# Patient Record
Sex: FEMALE | Race: ASIAN | NOT HISPANIC OR LATINO | Employment: OTHER | URBAN - METROPOLITAN AREA
[De-identification: names, ages, dates, MRNs, and addresses within clinical notes are randomized per-mention and may not be internally consistent; named-entity substitution may affect disease eponyms.]

---

## 2017-02-11 DIAGNOSIS — K27.9 PEPTIC ULCER DISEASE: ICD-10-CM

## 2017-02-11 RX ORDER — PANTOPRAZOLE SODIUM 40 MG/1
TABLET, DELAYED RELEASE ORAL
Qty: 90 TABLET | Refills: 3 | Status: SHIPPED | OUTPATIENT
Start: 2017-02-11 | End: 2017-12-18 | Stop reason: SDUPTHER

## 2017-03-09 RX ORDER — BENAZEPRIL HYDROCHLORIDE 40 MG/1
40 TABLET ORAL EVERY MORNING
Qty: 90 TABLET | Refills: 1 | Status: SHIPPED | OUTPATIENT
Start: 2017-03-09 | End: 2017-05-25 | Stop reason: SDUPTHER

## 2017-04-18 ENCOUNTER — OFFICE VISIT (OUTPATIENT)
Dept: CARDIOLOGY | Facility: CLINIC | Age: 82
End: 2017-04-18
Payer: MEDICARE

## 2017-04-18 VITALS
WEIGHT: 129 LBS | BODY MASS INDEX: 24.35 KG/M2 | HEART RATE: 61 BPM | DIASTOLIC BLOOD PRESSURE: 81 MMHG | HEIGHT: 61 IN | SYSTOLIC BLOOD PRESSURE: 196 MMHG

## 2017-04-18 DIAGNOSIS — I25.10 CORONARY ARTERY DISEASE INVOLVING NATIVE CORONARY ARTERY OF NATIVE HEART WITHOUT ANGINA PECTORIS: Primary | ICD-10-CM

## 2017-04-18 DIAGNOSIS — I48.20 CHRONIC ATRIAL FIBRILLATION: ICD-10-CM

## 2017-04-18 DIAGNOSIS — I10 ESSENTIAL HYPERTENSION: ICD-10-CM

## 2017-04-18 DIAGNOSIS — I50.42 CHRONIC COMBINED SYSTOLIC AND DIASTOLIC CONGESTIVE HEART FAILURE: ICD-10-CM

## 2017-04-18 DIAGNOSIS — E11.9 TYPE 2 DIABETES MELLITUS WITHOUT COMPLICATION, WITHOUT LONG-TERM CURRENT USE OF INSULIN: ICD-10-CM

## 2017-04-18 DIAGNOSIS — E78.00 PURE HYPERCHOLESTEROLEMIA: ICD-10-CM

## 2017-04-18 PROCEDURE — 99999 PR PBB SHADOW E&M-EST. PATIENT-LVL III: CPT | Mod: PBBFAC,,, | Performed by: NURSE PRACTITIONER

## 2017-04-18 PROCEDURE — 1126F AMNT PAIN NOTED NONE PRSNT: CPT | Mod: S$GLB,,, | Performed by: NURSE PRACTITIONER

## 2017-04-18 PROCEDURE — 1160F RVW MEDS BY RX/DR IN RCRD: CPT | Mod: S$GLB,,, | Performed by: NURSE PRACTITIONER

## 2017-04-18 PROCEDURE — 99499 UNLISTED E&M SERVICE: CPT | Mod: S$GLB,,, | Performed by: NURSE PRACTITIONER

## 2017-04-18 PROCEDURE — 99213 OFFICE O/P EST LOW 20 MIN: CPT | Mod: S$GLB,,, | Performed by: NURSE PRACTITIONER

## 2017-04-18 PROCEDURE — 1159F MED LIST DOCD IN RCRD: CPT | Mod: S$GLB,,, | Performed by: NURSE PRACTITIONER

## 2017-04-18 RX ORDER — PRAVASTATIN SODIUM 40 MG/1
40 TABLET ORAL DAILY
COMMUNITY
End: 2017-05-19 | Stop reason: CLARIF

## 2017-04-18 NOTE — PROGRESS NOTES
Ms. Birch was last seen in [facility] 4/6/2016 by Dr. Elam.      Subjective:   Patient ID:  Sachin Birch is a 89 y.o. female who presents for follow-up of Hypertension (1 yr f/u )  .   HPI:    Ms. Birch is a 89yo female with a PMHx of CAD (NSTEMI in 2013; pos SPECT 2013), chronic atrial fibrillation, HTN, and HLD here for follow up. Patient and daughter report that she is doing well. No acute events since last visit one year ago. No bleeding issues. She weighs herself daily with no changes in weight above 1lb. BPs at home in low 150s systolic, 70s diastolic (always runs higher in clinic). Patient denies chest pain with exertion or at rest, palpitations, SOB, HERNANDEZ, dizziness, syncope, leg edema, claudication, PND, or orthopnea. She exercises by walking 15 minutes daily. She is currently taking ASA 81mg, pravastatin 40mg for CAD management. Carvedilol 25mg BID for CHF management. Amlodipine 5mg and benazepril 40mg for hypertension management.    Recent Cardiac Tests:    Cardiac event monitor (4/8/2016):  CONCLUSION:  Sinus rhythm with rare atrial ectopy, and the patient-reported symptoms correlating with normal sinus rhythm.     2D Echo (4/6/2016):  CONCLUSIONS     1 - Mildly enlarged ascending aorta.     2 - Moderate left atrial enlargement.     3 - Eccentric hypertrophy.     4 - Normal left ventricular systolic function (EF 55-60%) - improved since 11/13.     5 - Left ventricular diastolic dysfunction.     6 - Low normal right ventricular systolic function .     7 - Mild to moderate aortic regurgitation.     8 - Mild to moderate pulmonic regurgitation.     9 - The estimated PA systolic pressure is 28 mmHg.     History reviewed. No pertinent family history.      Current Outpatient Prescriptions   Medication Sig    amlodipine (NORVASC) 5 MG tablet TAKE 1 TABLET ONE TIME DAILY    apixaban (ELIQUIS) 2.5 mg Tab Take 1 tablet (2.5 mg total) by mouth 2 (two) times daily.    aspirin 81 MG Chew Take 1 tablet (81  "mg total) by mouth once daily.    benazepril (LOTENSIN) 40 MG tablet Take 1 tablet (40 mg total) by mouth every morning.    CALCIUM CARBONATE/VITAMIN D3 (CALCIUM 500 + D, D3, ORAL) Take by mouth.      carvedilol (COREG) 25 MG tablet Take 1 tablet (25 mg total) by mouth 2 (two) times daily with meals.    pantoprazole (PROTONIX) 40 MG tablet TAKE 1 TABLET ONE TIME DAILY    pravastatin (PRAVACHOL) 40 MG tablet Take 40 mg by mouth once daily.     No current facility-administered medications for this visit.        Review of Systems   Constitution: Negative for diaphoresis, weakness and malaise/fatigue.   HENT: Negative for headaches.    Eyes: Negative for blurred vision.   Cardiovascular: Negative for chest pain, claudication, dyspnea on exertion, irregular heartbeat, leg swelling, orthopnea, palpitations, paroxysmal nocturnal dyspnea and syncope.   Respiratory: Negative for cough, shortness of breath, snoring and wheezing.    Hematologic/Lymphatic: Negative for adenopathy and bleeding problem.   Skin: Negative for rash.   Musculoskeletal: Positive for arthritis. Negative for back pain, muscle weakness and myalgias.   Gastrointestinal: Negative for abdominal pain, constipation, diarrhea, nausea and vomiting.   Genitourinary: Negative for dysuria and hematuria.   Neurological: Negative for dizziness, loss of balance, numbness and paresthesias.   Psychiatric/Behavioral: Negative for altered mental status.   Allergic/Immunologic: Negative for persistent infections.     Objective:     Right Arm BP - Sittin/81 (17 1315)  Left Arm BP - Sittin/60 (17 1345)    BP (!) 196/81 (BP Location: Left arm, Patient Position: Sitting, BP Method: Automatic)  Pulse 61  Ht 5' 1" (1.549 m)  Wt 58.5 kg (128 lb 15.5 oz)  BMI 24.37 kg/m2    Physical Exam   Constitutional: She is oriented to person, place, and time. She appears well-developed and well-nourished.   HENT:   Head: Normocephalic.   Nose: Nose normal. "   Eyes: Pupils are equal, round, and reactive to light.   Neck: No JVD present. Carotid bruit is not present. No thyromegaly present.   Cardiovascular: Normal rate, regular rhythm, S1 normal, S2 normal and intact distal pulses.   No extrasystoles are present. PMI is not displaced.  Exam reveals no gallop and no friction rub.    Murmur heard.  Pulses:       Carotid pulses are 2+ on the right side, and 2+ on the left side.       Radial pulses are 2+ on the right side, and 2+ on the left side.        Dorsalis pedis pulses are 2+ on the right side, and 2+ on the left side.   Pulmonary/Chest: Breath sounds normal. No stridor. No respiratory distress.   Abdominal: Soft. Bowel sounds are normal. She exhibits no distension. There is no tenderness.   Musculoskeletal: Normal range of motion. She exhibits no edema.   Lymphadenopathy:     She has no cervical adenopathy.   Neurological: She is alert and oriented to person, place, and time. She is not disoriented.   Skin: Skin is warm and dry. No rash noted. No erythema.   Psychiatric: She has a normal mood and affect. Her speech is normal and behavior is normal.   Nursing note and vitals reviewed.    *180/60 BP in office*    Lab Results   Component Value Date     03/28/2016    K 4.3 03/28/2016     03/28/2016    CO2 28 03/28/2016    BUN 24 (H) 03/28/2016    CREATININE 0.9 03/28/2016     (H) 03/28/2016    HGBA1C 6.5 (H) 03/28/2016    MG 2.2 04/30/2015    AST 8 (L) 04/27/2015    ALT <5 (L) 04/27/2015    ALBUMIN 3.2 (L) 04/27/2015    PROT 6.7 04/27/2015    BILITOT 0.3 04/27/2015    WBC 5.94 12/07/2015    HGB 13.3 12/07/2015    HCT 40.0 12/07/2015    MCV 94 12/07/2015     12/07/2015    TSH 0.523 09/08/2014    CHOL 190 03/25/2014    HDL 76 (H) 03/25/2014    LDLCALC 99.4 03/25/2014    TRIG 73 03/25/2014         Recent Labs  Lab 04/27/15  1617   INR 1.0        Test(s) Reviewed  I have reviewed the following in detail:  [] Stress test   [] Angiography   []  Echocardiogram   [x] Labs   [] Other:         Assessment:         1. Coronary artery disease involving native coronary artery of native heart without angina pectoris. On ASA and statin. Patient has no angina, SOB or other signs of ischemia. No unstable arrhythmia. No symptoms of heart failure.     2. Chronic atrial fibrillation. On eliquis. The patient has a [id number] score of 6. No bleeding episodes.     3. Chronic combined systolic and diastolic congestive heart failure. NYHA I-II Weighs self daily. Euvolemic and well compensated. No clinical signs of fluid overload. On BB/ACE. EF normal per 2016 echo.     4 Hypertension. Controlled per home BP report.      5. Pure hypercholesterolemia. On pravastatin 40mg. LDL under 100.     6. Type 2 diabetes mellitus without complication, without long-term current use of insulin. Controlled. HA1C 6.8.     Plan:     Sachin was seen today for hypertension.    Diagnoses and all orders for this visit:    Coronary artery disease involving native coronary artery of native heart without angina pectoris - Continue current medications.    Chronic atrial fibrillation - Continue current medications.    Chronic combined systolic and diastolic congestive heart failure - Continue current medications.    Pure hypercholesterolemia - Continue current medications.  -     pravastatin (PRAVACHOL) 40 MG tablet; Take 40 mg by mouth once daily.    Type 2 diabetes mellitus without complication, without long-term current use of insulin - Continue current medications.    Return in about 1 year (around 4/18/2018).

## 2017-04-18 NOTE — MR AVS SNAPSHOT
[facility]  [address]  [facility] [address]  Phone: [phone]                  Sachin Lyles Eyal   2017 1:20 PM   Office Visit    Description:  Female : 1926   Provider:  Soraida Scott NP   Department:  [facility]           Reason for Visit     Hypertension           Diagnoses this Visit        Comments    Coronary artery disease involving native coronary artery of native heart without angina pectoris    -  Primary     Chronic atrial fibrillation         Chronic combined systolic and diastolic congestive heart failure         Pure hypercholesterolemia         Type 2 diabetes mellitus without complication, without long-term current use of insulin                To Do List           Goals (5 Years of Data)     None      Follow-Up and Disposition     Return in about 1 year (around 2018).      [facility]     [facility] Nurse Care Line -  Assistance  Unless otherwise directed by your provider, please contact [facility], our nurse care line that is available for  assistance.     Registered nurses in the [facility] Center provide: appointment scheduling, clinical advisement, health education, and other advisory services.  Call: [phone] (toll free)               Medications           Message regarding Medications     Verify the changes and/or additions to your medication regime listed below are the same as discussed with your clinician today.  If any of these changes or additions are incorrect, please notify your healthcare provider.        STOP taking these medications     atorvastatin (LIPITOR) 40 MG tablet Take 1 tablet (40 mg total) by mouth once daily.           Verify that the below list of medications is an accurate representation of the medications you are currently taking.  If none reported, the list may be blank. If incorrect, please contact your healthcare provider. Carry this list with you in case of emergency.  "          Current Medications     amlodipine (NORVASC) 5 MG tablet TAKE 1 TABLET ONE TIME DAILY    apixaban (ELIQUIS) 2.5 mg Tab Take 1 tablet (2.5 mg total) by mouth 2 (two) times daily.    aspirin 81 MG Chew Take 1 tablet (81 mg total) by mouth once daily.    benazepril (LOTENSIN) 40 MG tablet Take 1 tablet (40 mg total) by mouth every morning.    CALCIUM CARBONATE/VITAMIN D3 (CALCIUM 500 + D, D3, ORAL) Take by mouth.      carvedilol (COREG) 25 MG tablet Take 1 tablet (25 mg total) by mouth 2 (two) times daily with meals.    pantoprazole (PROTONIX) 40 MG tablet TAKE 1 TABLET ONE TIME DAILY    pravastatin (PRAVACHOL) 40 MG tablet Take 40 mg by mouth once daily.           Clinical Reference Information           Your Vitals Were     BP Pulse Height Weight BMI    196/81 (BP Location: Left arm, Patient Position: Sitting, BP Method: Automatic) 61 5' 1" (1.549 m) 58.5 kg (128 lb 15.5 oz) 24.37 kg/m2      Blood Pressure          Most Recent Value    Right Arm BP - Sitting  195/81    Left Arm BP - Sitting  180/60    BP  (!)  196/81      Allergies as of 4/18/2017     No Known Allergies      Immunizations Administered on Date of Encounter - 4/18/2017     None      Instructions    Continue current medications.         Language Assistance Services     ATTENTION: Language assistance services are available, free of charge. Please call [phone].      ATENCIÓN: Si habla español, tiene a escamilla disposición servicios gratuitos de asistencia lingüística. Llevan al [phone].     [facility] Ý: N?u b?n nói Ti?ng Vi?t, có các d?ch v? h? tr? ngôn ng? mi?n phí dành cho b?n. G?i s? [phone].         [facility] complies with applicable Federal civil rights laws and does not discriminate on the basis of race, color, national origin, age, disability, or sex.        "

## 2017-05-03 RX ORDER — CARVEDILOL 25 MG/1
25 TABLET ORAL 2 TIMES DAILY WITH MEALS
Qty: 180 TABLET | Refills: 3 | Status: SHIPPED | OUTPATIENT
Start: 2017-05-03 | End: 2018-01-29 | Stop reason: SDUPTHER

## 2017-05-19 NOTE — TELEPHONE ENCOUNTER
vivek tells them they are out of refills and needs refills sent.  She is due for her yearly so i called daughter and we booked annual for next week.    i told her i'd send rx request to you to approve.  It should be time to renew her yearly refill.    Thanks loretta

## 2017-05-19 NOTE — TELEPHONE ENCOUNTER
----- Message from Sarai Torres sent at 5/19/2017  9:31 AM CDT -----  Contact: Daughter, More, [phone]  More called requesting to speak to you concerning medication atorvastatin.  [facility] sent a letter requesting a new rx due old rx expiring.  [place] is not sure if Pt should be taking this mediacation

## 2017-05-20 RX ORDER — ATORVASTATIN CALCIUM 40 MG/1
40 TABLET, FILM COATED ORAL DAILY
Qty: 90 TABLET | Refills: 3 | Status: SHIPPED | OUTPATIENT
Start: 2017-05-20 | End: 2018-07-30 | Stop reason: SDUPTHER

## 2017-05-23 ENCOUNTER — OFFICE VISIT (OUTPATIENT)
Dept: INTERNAL MEDICINE | Facility: CLINIC | Age: 82
End: 2017-05-23
Payer: MEDICARE

## 2017-05-23 VITALS
SYSTOLIC BLOOD PRESSURE: 138 MMHG | TEMPERATURE: 98 F | HEIGHT: 61 IN | HEART RATE: 68 BPM | RESPIRATION RATE: 16 BRPM | DIASTOLIC BLOOD PRESSURE: 64 MMHG | BODY MASS INDEX: 24.15 KG/M2 | WEIGHT: 127.88 LBS

## 2017-05-23 DIAGNOSIS — I10 ESSENTIAL HYPERTENSION: ICD-10-CM

## 2017-05-23 DIAGNOSIS — I48.20 CHRONIC ATRIAL FIBRILLATION: ICD-10-CM

## 2017-05-23 DIAGNOSIS — E11.9 TYPE 2 DIABETES MELLITUS WITHOUT COMPLICATION, WITHOUT LONG-TERM CURRENT USE OF INSULIN: ICD-10-CM

## 2017-05-23 DIAGNOSIS — I25.10 CORONARY ARTERY DISEASE INVOLVING NATIVE CORONARY ARTERY WITHOUT ANGINA PECTORIS, UNSPECIFIED WHETHER NATIVE OR TRANSPLANTED HEART: ICD-10-CM

## 2017-05-23 DIAGNOSIS — D50.0 IRON DEFICIENCY ANEMIA DUE TO CHRONIC BLOOD LOSS: ICD-10-CM

## 2017-05-23 DIAGNOSIS — Z00.00 ANNUAL PHYSICAL EXAM: Primary | ICD-10-CM

## 2017-05-23 DIAGNOSIS — E78.9 LIPID DISORDER: ICD-10-CM

## 2017-05-23 PROCEDURE — 99999 PR PBB SHADOW E&M-EST. PATIENT-LVL III: CPT | Mod: PBBFAC,,, | Performed by: INTERNAL MEDICINE

## 2017-05-23 PROCEDURE — 99499 UNLISTED E&M SERVICE: CPT | Mod: S$GLB,,, | Performed by: INTERNAL MEDICINE

## 2017-05-23 PROCEDURE — 99397 PER PM REEVAL EST PAT 65+ YR: CPT | Mod: S$GLB,,, | Performed by: INTERNAL MEDICINE

## 2017-05-23 NOTE — PROGRESS NOTES
Subjective:       Patient ID: Sachin Birch is a 89 y.o. female.    Chief Complaint: Annual Exam (doing fasting labs tommorrow, still need orders.  0 pain today.)  HISTORY OF PRESENT ILLNESS:  A 89-year-old  female, came in with her   daughter who stayed with her through the exam.  She has no complaints.  She had   been seen by Cardiology one month ago.  The patient has had a history of   coronary artery disease and atrial fibrillation, has been on Eliquis for quite a   while.  A year ago, she had an event monitor for 30 days, which did not show   any atrial fibrillation and there was discussion at that point about stopping   the Eliquis, which was not done.  This last visit, I attended other matters, not   her Eliquis.    PHYSICAL EXAMINATION:  VITAL SIGNS:  Normal.  SKIN:  Fair and healthy.  HEENT:  Clear.  NECK:  Shows no adenopathy, thyroid enlargement, or bruit.  CHEST:  Clear.  HEART:  There is a regular rhythm, no murmur that I can hear.  ABDOMEN:  Nontender without any organomegaly.  BREASTS:  Examined.  She has no masses or pain to palpation.  EXTREMITIES:  Show normal muscles, joints, pulses.  The feet were examined in   detail.  She has good pulses.  No edema, no break in the skin.  Minimal   arthritic changes.  Nails look good.  Sensation intact.  No weakness.  NEUROLOGIC:  Otherwise normal.    IMPRESSION:  1.  Exam normal overall.  2.  History of coronary artery disease.  3.  Atrial fibrillation.  I have made arrangements for Dr. Liu to see her.    She has been seeing varying people over at [facility].  It appears that she   could be taken off at the [facility].  She has had GI bleeding, which was severe   in the past and that appears to have stopped.  I did not have blood work on her.    She was not fasting at the time of this visit.  4.   History of GI bleeding and anemia that will be checked.  5.  Diabetes mellitus, generally controlled.  6.  Hypertension, controlled.  7.  Lipid  disorder, on medication.    I will see her again in six months.      JDC/HN  dd: 05/23/2017 11:29:33 (CDT)  td: 05/24/2017 18:01:03 (CDT)  Doc ID   #[id number]  Job ID #[id number]    CC:     [facility] [id number]  [facility]  Review of Systems   Constitutional: Negative.    HENT: Negative for congestion, hearing loss, sinus pressure, sneezing, sore throat and voice change.    Eyes: Negative for visual disturbance.   Respiratory: Negative for cough, chest tightness and shortness of breath.    Cardiovascular: Negative.  Negative for chest pain, palpitations and leg swelling.   Gastrointestinal: Negative.    Genitourinary: Negative for difficulty urinating, dysuria, flank pain, frequency, hematuria, menstrual problem, pelvic pain, urgency, vaginal bleeding and vaginal discharge.   Musculoskeletal: Negative.  Negative for neck pain and neck stiffness.   Skin: Negative.    Neurological: Negative.  Negative for dizziness, seizures, light-headedness, numbness and headaches.   Psychiatric/Behavioral: Negative for agitation, behavioral problems, confusion and sleep disturbance. The patient is not nervous/anxious.        Objective:      Physical Exam   Constitutional: She is oriented to person, place, and time. She appears well-developed and well-nourished.   Eyes: EOM are normal. Pupils are equal, round, and reactive to light.   Neck: Normal range of motion. Neck supple. No JVD present. No thyromegaly present.   Cardiovascular: Normal rate, regular rhythm, normal heart sounds and intact distal pulses.  Exam reveals no gallop.    No murmur heard.  Pulmonary/Chest: Breath sounds normal. She has no wheezes. She has no rales.   Abdominal: Soft. Bowel sounds are normal. She exhibits no mass. There is no tenderness.   Musculoskeletal: Normal range of motion.   Lymphadenopathy:     She has no cervical adenopathy.   Neurological: She is alert and oriented to person, place, and time. She has normal reflexes. No cranial nerve deficit.   Skin: No rash noted.  No erythema.   Psychiatric: She has a normal mood and affect. Judgment normal.       Assessment:       1. Annual physical exam    2. Coronary artery disease involving native coronary artery without angina pectoris, unspecified whether native or transplanted heart    3. Lipid disorder    4. Essential hypertension    5. Type 2 diabetes mellitus without complication, without long-term current use of insulin    6. Chronic atrial fibrillation    7. Iron deficiency anemia due to chronic blood loss        Plan:

## 2017-05-24 ENCOUNTER — LAB VISIT (OUTPATIENT)
Dept: LAB | Facility: HOSPITAL | Age: 82
End: 2017-05-24
Attending: INTERNAL MEDICINE
Payer: MEDICARE

## 2017-05-24 DIAGNOSIS — I48.20 CHRONIC ATRIAL FIBRILLATION: ICD-10-CM

## 2017-05-24 DIAGNOSIS — E78.9 LIPID DISORDER: ICD-10-CM

## 2017-05-24 DIAGNOSIS — D50.0 IRON DEFICIENCY ANEMIA DUE TO CHRONIC BLOOD LOSS: ICD-10-CM

## 2017-05-24 DIAGNOSIS — I25.10 CORONARY ARTERY DISEASE INVOLVING NATIVE CORONARY ARTERY WITHOUT ANGINA PECTORIS, UNSPECIFIED WHETHER NATIVE OR TRANSPLANTED HEART: ICD-10-CM

## 2017-05-24 DIAGNOSIS — E11.9 TYPE 2 DIABETES MELLITUS WITHOUT COMPLICATION, WITHOUT LONG-TERM CURRENT USE OF INSULIN: ICD-10-CM

## 2017-05-24 LAB
ALBUMIN SERPL BCP-MCNC: 3.5 G/DL
ALP SERPL-CCNC: 52 U/L
ALT SERPL W/O P-5'-P-CCNC: 6 U/L
ANION GAP SERPL CALC-SCNC: 6 MMOL/L
AST SERPL-CCNC: 12 U/L
BASOPHILS # BLD AUTO: 0.06 K/UL
BASOPHILS NFR BLD: 1 %
BILIRUB SERPL-MCNC: 0.4 MG/DL
BUN SERPL-MCNC: 26 MG/DL
CALCIUM SERPL-MCNC: 9.1 MG/DL
CHLORIDE SERPL-SCNC: 103 MMOL/L
CHOLEST/HDLC SERPL: 2.7 {RATIO}
CO2 SERPL-SCNC: 29 MMOL/L
CREAT SERPL-MCNC: 0.9 MG/DL
DIFFERENTIAL METHOD: ABNORMAL
EOSINOPHIL # BLD AUTO: 0.2 K/UL
EOSINOPHIL NFR BLD: 3.7 %
ERYTHROCYTE [DISTWIDTH] IN BLOOD BY AUTOMATED COUNT: 15.3 %
EST. GFR  (AFRICAN AMERICAN): >60 ML/MIN/1.73 M^2
EST. GFR  (NON AFRICAN AMERICAN): 56.5 ML/MIN/1.73 M^2
ESTIMATED AVG GLUCOSE: 166 MG/DL
GLUCOSE SERPL-MCNC: 130 MG/DL
HBA1C MFR BLD HPLC: 7.4 %
HCT VFR BLD AUTO: 32.7 %
HDL/CHOLESTEROL RATIO: 37.5 %
HDLC SERPL-MCNC: 144 MG/DL
HDLC SERPL-MCNC: 54 MG/DL
HGB BLD-MCNC: 9.7 G/DL
LDLC SERPL CALC-MCNC: 69.6 MG/DL
LYMPHOCYTES # BLD AUTO: 1.8 K/UL
LYMPHOCYTES NFR BLD: 29.3 %
MCH RBC QN AUTO: 24.1 PG
MCHC RBC AUTO-ENTMCNC: 29.7 %
MCV RBC AUTO: 81 FL
MONOCYTES # BLD AUTO: 0.6 K/UL
MONOCYTES NFR BLD: 9.2 %
NEUTROPHILS # BLD AUTO: 3.6 K/UL
NEUTROPHILS NFR BLD: 56.6 %
NONHDLC SERPL-MCNC: 90 MG/DL
PLATELET # BLD AUTO: 294 K/UL
PMV BLD AUTO: 10 FL
POTASSIUM SERPL-SCNC: 4.3 MMOL/L
PROT SERPL-MCNC: 7.1 G/DL
RBC # BLD AUTO: 4.02 M/UL
SODIUM SERPL-SCNC: 138 MMOL/L
T4 FREE SERPL-MCNC: 1.16 NG/DL
TRIGL SERPL-MCNC: 102 MG/DL
TSH SERPL DL<=0.005 MIU/L-ACNC: 0.56 UIU/ML
WBC # BLD AUTO: 6.28 K/UL

## 2017-05-24 PROCEDURE — 85025 COMPLETE CBC W/AUTO DIFF WBC: CPT

## 2017-05-24 PROCEDURE — 84443 ASSAY THYROID STIM HORMONE: CPT

## 2017-05-24 PROCEDURE — 36415 COLL VENOUS BLD VENIPUNCTURE: CPT | Mod: PO

## 2017-05-24 PROCEDURE — 84439 ASSAY OF FREE THYROXINE: CPT

## 2017-05-24 PROCEDURE — 80061 LIPID PANEL: CPT

## 2017-05-24 PROCEDURE — 83036 HEMOGLOBIN GLYCOSYLATED A1C: CPT

## 2017-05-24 PROCEDURE — 80053 COMPREHEN METABOLIC PANEL: CPT

## 2017-05-25 RX ORDER — BENAZEPRIL HYDROCHLORIDE 40 MG/1
TABLET ORAL
Qty: 90 TABLET | Refills: 1 | Status: SHIPPED | OUTPATIENT
Start: 2017-05-25 | End: 2018-01-29 | Stop reason: SDUPTHER

## 2017-06-07 ENCOUNTER — OFFICE VISIT (OUTPATIENT)
Dept: CARDIOLOGY | Facility: CLINIC | Age: 82
End: 2017-06-07
Payer: MEDICARE

## 2017-06-07 VITALS
SYSTOLIC BLOOD PRESSURE: 158 MMHG | WEIGHT: 132.25 LBS | HEIGHT: 62 IN | DIASTOLIC BLOOD PRESSURE: 64 MMHG | BODY MASS INDEX: 24.34 KG/M2 | HEART RATE: 64 BPM

## 2017-06-07 DIAGNOSIS — I25.10 CORONARY ARTERY DISEASE INVOLVING NATIVE CORONARY ARTERY OF NATIVE HEART WITHOUT ANGINA PECTORIS: ICD-10-CM

## 2017-06-07 DIAGNOSIS — I10 ESSENTIAL HYPERTENSION: ICD-10-CM

## 2017-06-07 DIAGNOSIS — I42.9 CARDIOMYOPATHY, UNSPECIFIED TYPE: ICD-10-CM

## 2017-06-07 DIAGNOSIS — I48.4 ATYPICAL ATRIAL FLUTTER: Primary | ICD-10-CM

## 2017-06-07 DIAGNOSIS — K27.9 PUD (PEPTIC ULCER DISEASE): ICD-10-CM

## 2017-06-07 PROCEDURE — 99499 UNLISTED E&M SERVICE: CPT | Mod: S$GLB,,, | Performed by: INTERNAL MEDICINE

## 2017-06-07 PROCEDURE — 1126F AMNT PAIN NOTED NONE PRSNT: CPT | Mod: S$GLB,,, | Performed by: INTERNAL MEDICINE

## 2017-06-07 PROCEDURE — 99999 PR PBB SHADOW E&M-EST. PATIENT-LVL III: CPT | Mod: PBBFAC,,, | Performed by: INTERNAL MEDICINE

## 2017-06-07 PROCEDURE — 99215 OFFICE O/P EST HI 40 MIN: CPT | Mod: S$GLB,,, | Performed by: INTERNAL MEDICINE

## 2017-06-07 PROCEDURE — 1159F MED LIST DOCD IN RCRD: CPT | Mod: S$GLB,,, | Performed by: INTERNAL MEDICINE

## 2017-06-07 PROCEDURE — 93000 ELECTROCARDIOGRAM COMPLETE: CPT | Mod: S$GLB,,, | Performed by: INTERNAL MEDICINE

## 2017-06-07 NOTE — PROGRESS NOTES
Subjective:   Patient ID:  Sachin Birch is a 89 y.o. female who presents for a second opinion regarding Atrial Fibrillation      Problem List:  Atrial flutter 7/13  GI bleed 7/13  Cardiomyopathy (likely tachycardia induced) 7/13  - resolved  Presumed CAD  - wall motion abnormalities and NSTEMI 7/13    HPI:   I am being asked to see Sachin Birch by Dr. Panda. She is an incredibly alert and agile 89-year-old was accompanied in clinic today by her daughter More.  She had a GI bleed and NST ESTEFANÍA in 7/13.  During that hospitalization she had atrial flutter.  She had a bleeding ulcer that was cauterized.  After the bleeding was stabilized, she was anticoagulated and has remained on low-dose apixaban.  During the same hospitalization she was noted to have a elevated troponin levels and was labelled to have a MI and cardiomyopathy with a LVEF of 25%.  She converted to sinus rhythm while still in hospital after treatment of heart failure.  A SPECT study revealed fixed defects in the anterior and lateral walls. LV function eventually recovered recovered with medical therapy: carvedilol and benazepril.    There have been no recurrences of atrial fibrillation flutter.  She does not report palpitations, lightheadedness or dizziness. A 30 day event monitor in 4/16 revealed no tachyarrhythmia.  She checks her blood pressure and pulse every day.  She lives by herself but goes for a walk in the mall with her daughter regularly.  She does not require a cane. She does not report bleeding or excessive bruising. She also takes low dose aspirin.     BP was elevated in the Cardiology clinic in 4/17 and is a bit elevated today but was 138/64 mm Hg in Dr. Panda's office in 5/17.    Echo 4/16: LV 5.6 cm (enlarged), EF 55%, akinesis of basal inferior wall. DIastolic dysfunction and mild-moderate aortic regurgitation.    Review of Systems   Constitution: Negative for weakness, malaise/fatigue, weight gain and weight loss.   HENT:  "Positive for headaches.    Cardiovascular: Positive for dyspnea on exertion. Negative for chest pain, claudication, irregular heartbeat, leg swelling, near-syncope, palpitations and syncope.   Respiratory: Positive for cough. Negative for hemoptysis, sputum production and wheezing.    Hematologic/Lymphatic: Does not bruise/bleed easily.   Musculoskeletal: Positive for arthritis. Negative for back pain, joint pain, joint swelling, muscle cramps and muscle weakness.   Gastrointestinal: Negative for abdominal pain, change in bowel habit, heartburn and melena.   Genitourinary: Positive for nocturia. Negative for frequency and hematuria.   Neurological: Negative for dizziness, light-headedness, loss of balance, numbness and paresthesias.   Psychiatric/Behavioral: Negative for depression. The patient is not nervous/anxious.        Current Outpatient Prescriptions   Medication Sig    amlodipine (NORVASC) 5 MG tablet TAKE 1 TABLET ONE TIME DAILY    apixaban (ELIQUIS) 2.5 mg Tab Take 1 tablet (2.5 mg total) by mouth 2 (two) times daily.    aspirin 81 MG Chew Take 1 tablet (81 mg total) by mouth once daily.    atorvastatin (LIPITOR) 40 MG tablet Take 1 tablet (40 mg total) by mouth once daily.    benazepril (LOTENSIN) 40 MG tablet TAKE 1 TABLET EVERY MORNING    CALCIUM CARBONATE/VITAMIN D3 (CALCIUM 500 + D, D3, ORAL) Take 1 tablet by mouth Daily.     carvedilol (COREG) 25 MG tablet Take 1 tablet (25 mg total) by mouth 2 (two) times daily with meals.    pantoprazole (PROTONIX) 40 MG tablet TAKE 1 TABLET ONE TIME DAILY         Social History   Substance Use Topics    Smoking status: Never Smoker    Smokeless tobacco: Never Used    Alcohol use No         Objective:     Physical Exam   Constitutional: She is oriented to person, place, and time. She appears well-developed and well-nourished.   BP (!) 158/64   Pulse 64   Ht 5' 2" (1.575 m)   Wt 60 kg (132 lb 4.4 oz)   BMI 24.19 kg/m²      HENT:   Head: Normocephalic. "   Neck: No JVD present. Carotid bruit is not present.   Cardiovascular: Normal rate, regular rhythm, S1 normal and S2 normal.  Exam reveals no gallop.    No murmur heard.  Pulses:       Posterior tibial pulses are 2+ on the right side, and 2+ on the left side.   Pulmonary/Chest: Effort normal. She has no wheezes. She has no rales. Chest wall is not dull to percussion.   Abdominal: Soft. She exhibits no abdominal bruit. There is no splenomegaly or hepatomegaly. There is no tenderness.   Musculoskeletal:        Right lower leg: She exhibits no edema.        Left lower leg: She exhibits no edema.   Neurological: She is alert and oriented to person, place, and time. Gait normal.   Skin: Skin is warm and dry. No bruising and no rash noted. No cyanosis. Nails show no clubbing.   Psychiatric: She has a normal mood and affect. Her speech is normal and behavior is normal. Judgment normal. Cognition and memory are normal.         Lab Results   Component Value Date    CHOL 144 05/24/2017    HDL 54 05/24/2017    LDLCALC 69.6 05/24/2017    TRIG 102 05/24/2017    CHOLHDL 37.5 05/24/2017     Lab Results   Component Value Date     (H) 05/24/2017    CREATININE 0.9 05/24/2017    BUN 26 (H) 05/24/2017     05/24/2017    K 4.3 05/24/2017     05/24/2017    CO2 29 05/24/2017     Lab Results   Component Value Date    ALT 6 (L) 05/24/2017    AST 12 05/24/2017    ALKPHOS 52 (L) 05/24/2017    BILITOT 0.4 05/24/2017       ECG today reviewed by me. It reveals sinus rhythm with 1st degree AV block and possible LVH.      Assessment:     1. Atypical atrial flutter    2. Cardiomyopathy - resolved    3. Essential hypertension    4. Coronary artery disease involving native coronary artery of native heart without angina pectoris    5. PUD (peptic ulcer disease)          Plan:       The atrial flutter occurred while she was hospitalized with a GI bleed and non-STEMI.   Although her [id number] score is high and she does not appear  to have a significantly high risk of bleeding (both reasons to continue anticoagulation), there has been no recurrence of atrial flutter in nearly 3 years.  I will obtain a 30 day event monitor and if there is no atrial fib or flutter, it is reasonable to discontinue apixaban. She should continue aspirin.  Explained to patient and daughter that atrial flutter may recur during other illnesses.Continue to monitor heart rate at home.  The results of the COMPASS trial are probably not applicable here. The study showed that the use of aspirin + very low dose rivaroxaban (2.5 mg bid) was better in patients with CAD and PAD than aspirin alone. However this was not a study of patient's w atrial fib.  Return to clinic in 1 year.

## 2017-06-07 NOTE — Clinical Note
Dear Larry, Thank you for referring Sachin Birch  to the [facility] at [place]. I will obtain a 30 day event monitor and if there is no atrial fib or flutter, I will discontinue apixaban. She should continue aspirin. Raphaelr

## 2017-06-07 NOTE — PATIENT INSTRUCTIONS
I agree with Dr. Panda.  I think it would be reasonable to stop the Eliquis but continue aspirin if a repeat 30 day monitor does not show any signs of atrial fibrillation flutter.  I want you to continue to check your heart rate regularly and let us know if you note a increase in the heart rate.  In that case I would restart the Eliquis

## 2017-06-09 ENCOUNTER — CLINICAL SUPPORT (OUTPATIENT)
Dept: ELECTROPHYSIOLOGY | Facility: CLINIC | Age: 82
End: 2017-06-09
Payer: MEDICARE

## 2017-06-09 DIAGNOSIS — I48.4 ATYPICAL ATRIAL FLUTTER: ICD-10-CM

## 2017-06-15 PROCEDURE — 93268 ECG RECORD/REVIEW: CPT | Mod: S$GLB,,, | Performed by: INTERNAL MEDICINE

## 2017-07-31 ENCOUNTER — TELEPHONE (OUTPATIENT)
Dept: CARDIOLOGY | Facility: CLINIC | Age: 82
End: 2017-07-31

## 2017-07-31 NOTE — TELEPHONE ENCOUNTER
----- Message from Liang Liu MD sent at 7/30/2017  8:36 PM CDT -----  30 day monitor reviewed. No arrhythmias noted. I think it is safe to discontinue the blood thinner Eliquis. She should continue aspirin and monitor her heart rate 1-2 times a week. She should let us know if she detects an increase in her heart rate. She can either use a BP monitor or a pulse oximeter. See [facility]  If pt/daughter agree, pl take Eliquis off her med record.

## 2017-07-31 NOTE — TELEPHONE ENCOUNTER
Pt's daughter, More notified, states she will have pt continue current supply of Eliquis then discontinue it. More states she will call our office when pt completes Eliquis so medcard can be updated.

## 2017-08-01 RX ORDER — AMLODIPINE BESYLATE 5 MG/1
TABLET ORAL
Qty: 90 TABLET | Refills: 3 | OUTPATIENT
Start: 2017-08-01

## 2017-08-07 RX ORDER — AMLODIPINE BESYLATE 5 MG/1
TABLET ORAL
Qty: 90 TABLET | Refills: 3 | Status: SHIPPED | OUTPATIENT
Start: 2017-08-07 | End: 2018-06-01 | Stop reason: SDUPTHER

## 2017-08-22 ENCOUNTER — TELEPHONE (OUTPATIENT)
Dept: CARDIOLOGY | Facility: CLINIC | Age: 82
End: 2017-08-22

## 2017-08-22 ENCOUNTER — TELEPHONE (OUTPATIENT)
Dept: INTERNAL MEDICINE | Facility: CLINIC | Age: 82
End: 2017-08-22

## 2017-08-22 NOTE — TELEPHONE ENCOUNTER
----- Message from Corina Rodriguez sent at 8/22/2017  9:20 AM CDT -----  Contact: Bridgett Julien/Daughter/[phone]  States she would no longer be taking the script  apixaban (ELIQUIS) 2.5 mg Tab per DR Liu. Today will be pt's last dosage. Please advise.

## 2017-08-22 NOTE — TELEPHONE ENCOUNTER
Pt's daughter, More calling to inform our office pt has completed Eliquis. notified. Medcard updated

## 2017-11-05 NOTE — LETTER
Assumed care of pt. Pt placed in position of comfort. Call bell within reach. Family at bedside. Pt states weakness with walking for the last week. Pt states his legs have been giving out. June 7, 2017      Kenneth Panda MD  [address]  [place] [address]           [place] [facility]  [address]  [place] [address]  Phone: [phone]          Patient: Sachin Birch   MR Number: [id number]   YOB: 1926   Date of Visit: 6/7/2017       Dear Dr. Kenneth Panda:    Thank you for referring Sachin Birch to me for evaluation. Attached you will find relevant portions of my assessment and plan of care.    If you have questions, please do not hesitate to call me. I look forward to following Sachin Birch along with you.    Sincerely,    Liang Liu MD    Enclosure  CC:  No Recipients    If you would like to receive this communication electronically, please contact [email or web address] or [phone] to request more information on [facility] access.    For providers and/or their staff who would like to refer a patient to [facility], please contact us through our one-stop-shop provider referral line, [facility], at [phone].    If you feel you have received this communication in error or would no longer like to receive these types of communications, please e-mail [email or web address]

## 2017-11-20 ENCOUNTER — LAB VISIT (OUTPATIENT)
Dept: LAB | Facility: HOSPITAL | Age: 82
End: 2017-11-20
Attending: INTERNAL MEDICINE
Payer: MEDICARE

## 2017-11-20 ENCOUNTER — OFFICE VISIT (OUTPATIENT)
Dept: INTERNAL MEDICINE | Facility: CLINIC | Age: 82
End: 2017-11-20
Payer: MEDICARE

## 2017-11-20 ENCOUNTER — TELEPHONE (OUTPATIENT)
Dept: INTERNAL MEDICINE | Facility: CLINIC | Age: 82
End: 2017-11-20

## 2017-11-20 VITALS
BODY MASS INDEX: 25.68 KG/M2 | DIASTOLIC BLOOD PRESSURE: 74 MMHG | HEART RATE: 80 BPM | HEIGHT: 61 IN | RESPIRATION RATE: 16 BRPM | OXYGEN SATURATION: 89 % | SYSTOLIC BLOOD PRESSURE: 122 MMHG | WEIGHT: 136 LBS | TEMPERATURE: 99 F

## 2017-11-20 DIAGNOSIS — E78.9 LIPID DISORDER: ICD-10-CM

## 2017-11-20 DIAGNOSIS — I10 ESSENTIAL HYPERTENSION: ICD-10-CM

## 2017-11-20 DIAGNOSIS — I48.20 CHRONIC ATRIAL FIBRILLATION: ICD-10-CM

## 2017-11-20 DIAGNOSIS — D53.9 NUTRITIONAL ANEMIA: ICD-10-CM

## 2017-11-20 DIAGNOSIS — E11.9 TYPE 2 DIABETES MELLITUS WITHOUT COMPLICATION, WITHOUT LONG-TERM CURRENT USE OF INSULIN: ICD-10-CM

## 2017-11-20 DIAGNOSIS — I25.10 CORONARY ARTERY DISEASE INVOLVING NATIVE CORONARY ARTERY WITHOUT ANGINA PECTORIS, UNSPECIFIED WHETHER NATIVE OR TRANSPLANTED HEART: Primary | ICD-10-CM

## 2017-11-20 DIAGNOSIS — D64.9 ANEMIA, UNSPECIFIED TYPE: Primary | ICD-10-CM

## 2017-11-20 DIAGNOSIS — I25.5 ISCHEMIC CARDIOMYOPATHY: ICD-10-CM

## 2017-11-20 LAB
ANION GAP SERPL CALC-SCNC: 7 MMOL/L
BASOPHILS # BLD AUTO: 0.06 K/UL
BASOPHILS NFR BLD: 1.1 %
BUN SERPL-MCNC: 19 MG/DL
CALCIUM SERPL-MCNC: 9.1 MG/DL
CHLORIDE SERPL-SCNC: 102 MMOL/L
CO2 SERPL-SCNC: 30 MMOL/L
CREAT SERPL-MCNC: 0.8 MG/DL
DIFFERENTIAL METHOD: ABNORMAL
EOSINOPHIL # BLD AUTO: 0.2 K/UL
EOSINOPHIL NFR BLD: 3.7 %
ERYTHROCYTE [DISTWIDTH] IN BLOOD BY AUTOMATED COUNT: 16.6 %
EST. GFR  (AFRICAN AMERICAN): >60 ML/MIN/1.73 M^2
EST. GFR  (NON AFRICAN AMERICAN): >60 ML/MIN/1.73 M^2
ESTIMATED AVG GLUCOSE: 166 MG/DL
GLUCOSE SERPL-MCNC: 151 MG/DL
HBA1C MFR BLD HPLC: 7.4 %
HCT VFR BLD AUTO: 30.9 %
HGB BLD-MCNC: 8.8 G/DL
IMM GRANULOCYTES # BLD AUTO: 0.01 K/UL
IMM GRANULOCYTES NFR BLD AUTO: 0.2 %
LYMPHOCYTES # BLD AUTO: 1.7 K/UL
LYMPHOCYTES NFR BLD: 29 %
MCH RBC QN AUTO: 21.8 PG
MCHC RBC AUTO-ENTMCNC: 28.5 G/DL
MCV RBC AUTO: 77 FL
MONOCYTES # BLD AUTO: 0.5 K/UL
MONOCYTES NFR BLD: 9.2 %
NEUTROPHILS # BLD AUTO: 3.2 K/UL
NEUTROPHILS NFR BLD: 56.8 %
NRBC BLD-RTO: 0 /100 WBC
PLATELET # BLD AUTO: 293 K/UL
PMV BLD AUTO: 9.9 FL
POTASSIUM SERPL-SCNC: 4.8 MMOL/L
RBC # BLD AUTO: 4.03 M/UL
SODIUM SERPL-SCNC: 139 MMOL/L
WBC # BLD AUTO: 5.68 K/UL

## 2017-11-20 PROCEDURE — 99214 OFFICE O/P EST MOD 30 MIN: CPT | Mod: S$GLB,,, | Performed by: INTERNAL MEDICINE

## 2017-11-20 PROCEDURE — 36415 COLL VENOUS BLD VENIPUNCTURE: CPT | Mod: PO

## 2017-11-20 PROCEDURE — 99499 UNLISTED E&M SERVICE: CPT | Mod: S$GLB,,, | Performed by: INTERNAL MEDICINE

## 2017-11-20 PROCEDURE — 80048 BASIC METABOLIC PNL TOTAL CA: CPT

## 2017-11-20 PROCEDURE — G0008 ADMIN INFLUENZA VIRUS VAC: HCPCS | Mod: S$GLB,,, | Performed by: INTERNAL MEDICINE

## 2017-11-20 PROCEDURE — 90662 IIV NO PRSV INCREASED AG IM: CPT | Mod: S$GLB,,, | Performed by: INTERNAL MEDICINE

## 2017-11-20 PROCEDURE — 83036 HEMOGLOBIN GLYCOSYLATED A1C: CPT

## 2017-11-20 PROCEDURE — 85025 COMPLETE CBC W/AUTO DIFF WBC: CPT

## 2017-11-20 PROCEDURE — 99999 PR PBB SHADOW E&M-EST. PATIENT-LVL III: CPT | Mod: PBBFAC,,, | Performed by: INTERNAL MEDICINE

## 2017-11-20 NOTE — PROGRESS NOTES
Subjective:       Patient ID: Sachin Birch is a 89 y.o. female.    Chief Complaint: Follow-up (6 month follow up)  HISTORY OF PRESENT ILLNESS:  A 89-year-old  female well known to me, who   comes in with one of her daughters and both of them say she is feeling quite   well.  She is currently taking two trips to [place] and [place] in the coming   weeks.  She feels up to that.  She wanted to get her flu shot today.  She has no   complaints.    PHYSICAL EXAMINATION:  VITAL SIGNS:  Normal.  SKIN:  Fair and healthy.  HEENT:  Clear.  NECK:  Shows no thyroid enlargement or bruit.  CHEST:  Clear.  HEART:  There is no murmur or gallop.  Her rhythm is regular and she is now off   of any blood thinning.  EXTREMITIES:  Show no edema.  NEUROLOGIC:  She is fine.    IMPRESSION:  1.  Hypertension, controlled.  2.  Normal sinus rhythm.  3.  Coronary artery disease, stable.  4.  Diabetes.  I am sending her down for lab, she has not eaten yet today and   she is going to be given a flu shot.      ERNESTO/BREE  dd: 11/20/2017 11:02:18 (CST)  td: 11/21/2017 08:48:55 (CST)  Doc ID   #[id number]  Job ID #[id number]    CC:     Dict [id number]  HPI  Review of Systems   Constitutional: Negative.    HENT: Negative for congestion, hearing loss, sinus pressure, sneezing, sore throat and voice change.    Eyes: Negative for visual disturbance.   Respiratory: Negative for cough, chest tightness and shortness of breath.    Cardiovascular: Negative.  Negative for chest pain, palpitations and leg swelling.   Gastrointestinal: Negative.    Genitourinary: Negative for difficulty urinating, dysuria, flank pain, frequency, hematuria, menstrual problem, pelvic pain, urgency, vaginal bleeding and vaginal discharge.   Musculoskeletal: Negative.  Negative for neck pain and neck stiffness.   Skin: Negative.    Neurological: Negative.  Negative for dizziness, seizures, light-headedness, numbness and headaches.   Psychiatric/Behavioral: Negative for agitation,  behavioral problems, confusion and sleep disturbance. The patient is not nervous/anxious.        Objective:      Physical Exam   Constitutional: She is oriented to person, place, and time. She appears well-developed and well-nourished.   Eyes: EOM are normal. Pupils are equal, round, and reactive to light.   Neck: Normal range of motion. Neck supple. No JVD present. No thyromegaly present.   Cardiovascular: Normal rate, regular rhythm, normal heart sounds and intact distal pulses.  Exam reveals no gallop.    No murmur heard.  Pulmonary/Chest: Breath sounds normal. She has no wheezes. She has no rales.   Abdominal: Soft. Bowel sounds are normal. She exhibits no mass. There is no tenderness.   Musculoskeletal: Normal range of motion.   Lymphadenopathy:     She has no cervical adenopathy.   Neurological: She is alert and oriented to person, place, and time. She has normal reflexes. No cranial nerve deficit.   Skin: No rash noted. No erythema.   Psychiatric: She has a normal mood and affect. Judgment normal.       Assessment:       1. Coronary artery disease involving native coronary artery without angina pectoris, unspecified whether native or transplanted heart    2. Lipid disorder    3. Essential hypertension    4. Type 2 diabetes mellitus without complication, without long-term current use of insulin    5. Chronic atrial fibrillation    6. Ischemic cardiomyopathy        Plan:

## 2017-11-21 NOTE — TELEPHONE ENCOUNTER
----- Message from Kenneth Panda MD sent at 11/20/2017 10:23 PM CST -----  Her lab shows anemia and should get fe/tibc,vit b12

## 2017-12-18 DIAGNOSIS — K27.9 PEPTIC ULCER DISEASE: ICD-10-CM

## 2017-12-18 RX ORDER — PANTOPRAZOLE SODIUM 40 MG/1
TABLET, DELAYED RELEASE ORAL
Qty: 90 TABLET | Refills: 3 | Status: SHIPPED | OUTPATIENT
Start: 2017-12-18 | End: 2019-02-18 | Stop reason: SDUPTHER

## 2017-12-18 NOTE — TELEPHONE ENCOUNTER
[facility] msg sent end of November and  wanted more labwork.    She is due back in may and can also have the labs then if family doesn't want to do now.

## 2018-01-23 ENCOUNTER — PES CALL (OUTPATIENT)
Dept: ADMINISTRATIVE | Facility: CLINIC | Age: 83
End: 2018-01-23

## 2018-01-23 ENCOUNTER — TELEPHONE (OUTPATIENT)
Dept: INTERNAL MEDICINE | Facility: CLINIC | Age: 83
End: 2018-01-23

## 2018-01-23 DIAGNOSIS — I10 BENIGN HYPERTENSION: Primary | ICD-10-CM

## 2018-01-23 DIAGNOSIS — E11.9 DIABETES MELLITUS WITHOUT COMPLICATION: ICD-10-CM

## 2018-01-23 NOTE — TELEPHONE ENCOUNTER
----- Message from Birgit Dye sent at 1/23/2018  8:26 AM CST -----  Contact: Self [phone]  Doctor appointment and lab have been scheduled.  Please link lab orders to the lab appointment.  Date of doctor appointment:    Physical or EP:  05/29  Date of lab appointment: 05/22   Comments:

## 2018-01-29 RX ORDER — CARVEDILOL 25 MG/1
25 TABLET ORAL 2 TIMES DAILY WITH MEALS
Qty: 180 TABLET | Refills: 3 | Status: SHIPPED | OUTPATIENT
Start: 2018-01-29 | End: 2019-02-18 | Stop reason: SDUPTHER

## 2018-01-29 RX ORDER — BENAZEPRIL HYDROCHLORIDE 40 MG/1
40 TABLET ORAL EVERY MORNING
Qty: 90 TABLET | Refills: 3 | Status: SHIPPED | OUTPATIENT
Start: 2018-01-29 | End: 2019-02-18 | Stop reason: SDUPTHER

## 2018-01-29 NOTE — TELEPHONE ENCOUNTER
"----- Message from Marcin Sebastian sent at 1/29/2018  9:16 AM CST -----  Contact: Bridgett Julien/daughter   RX request - refill or new RX.  Is this a refill or new RX:  Refill   RX name and strength: carvedilol (COREG) 25 MG tablet  Directions: Take 1 tablet (25 mg total) by mouth 2 (two) times daily with meals.   Is this a 30 day or 90 day RX:  90  Pharmacy name and phone # (DON'T enter "on file" or "in chart"): [facility] -[phone] (Phone)  [phone] (Fax)    RX request - refill or new RX.  Is this a refill or new RX:  Refill   RX name and strength: benazepril (LOTENSIN) 40 MG tablet  Directions: TAKE 1 TABLET EVERY MORNING  Is this a 30 day or 90 day RX:  90                 "

## 2018-05-21 PROBLEM — F33.0 MDD (MAJOR DEPRESSIVE DISORDER), RECURRENT EPISODE, MILD: Status: ACTIVE | Noted: 2018-05-21

## 2018-05-21 PROBLEM — I70.0 ATHEROSCLEROSIS OF AORTA: Status: ACTIVE | Noted: 2018-05-21

## 2018-05-21 PROBLEM — I77.1 TORTUOUS AORTA: Status: ACTIVE | Noted: 2018-05-21

## 2018-05-21 PROBLEM — I35.1 NONRHEUMATIC AORTIC VALVE INSUFFICIENCY: Status: ACTIVE | Noted: 2018-05-21

## 2018-05-22 ENCOUNTER — LAB VISIT (OUTPATIENT)
Dept: LAB | Facility: HOSPITAL | Age: 83
End: 2018-05-22
Attending: INTERNAL MEDICINE
Payer: MEDICARE

## 2018-05-22 ENCOUNTER — OFFICE VISIT (OUTPATIENT)
Dept: INTERNAL MEDICINE | Facility: CLINIC | Age: 83
End: 2018-05-22
Payer: MEDICARE

## 2018-05-22 VITALS
BODY MASS INDEX: 25.68 KG/M2 | TEMPERATURE: 98 F | HEART RATE: 70 BPM | HEIGHT: 61 IN | DIASTOLIC BLOOD PRESSURE: 64 MMHG | WEIGHT: 136 LBS | RESPIRATION RATE: 15 BRPM | SYSTOLIC BLOOD PRESSURE: 120 MMHG

## 2018-05-22 DIAGNOSIS — E11.9 TYPE 2 DIABETES MELLITUS WITHOUT COMPLICATION, WITHOUT LONG-TERM CURRENT USE OF INSULIN: ICD-10-CM

## 2018-05-22 DIAGNOSIS — I25.10 CORONARY ARTERY DISEASE INVOLVING NATIVE CORONARY ARTERY OF NATIVE HEART WITHOUT ANGINA PECTORIS: ICD-10-CM

## 2018-05-22 DIAGNOSIS — Z00.00 ENCOUNTER FOR PREVENTIVE HEALTH EXAMINATION: Primary | ICD-10-CM

## 2018-05-22 DIAGNOSIS — D53.9 NUTRITIONAL ANEMIA: ICD-10-CM

## 2018-05-22 DIAGNOSIS — I35.1 NONRHEUMATIC AORTIC VALVE INSUFFICIENCY: ICD-10-CM

## 2018-05-22 DIAGNOSIS — R41.3 MEMORY CHANGES: ICD-10-CM

## 2018-05-22 DIAGNOSIS — H61.23 BILATERAL IMPACTED CERUMEN: ICD-10-CM

## 2018-05-22 DIAGNOSIS — E11.9 DIABETES MELLITUS WITHOUT COMPLICATION: ICD-10-CM

## 2018-05-22 DIAGNOSIS — D64.9 ANEMIA, UNSPECIFIED TYPE: ICD-10-CM

## 2018-05-22 DIAGNOSIS — E11.69 HYPERLIPIDEMIA ASSOCIATED WITH TYPE 2 DIABETES MELLITUS: ICD-10-CM

## 2018-05-22 DIAGNOSIS — E78.5 HYPERLIPIDEMIA ASSOCIATED WITH TYPE 2 DIABETES MELLITUS: ICD-10-CM

## 2018-05-22 DIAGNOSIS — I48.92 ATRIAL FLUTTER, UNSPECIFIED TYPE: ICD-10-CM

## 2018-05-22 DIAGNOSIS — K27.9 PUD (PEPTIC ULCER DISEASE): ICD-10-CM

## 2018-05-22 DIAGNOSIS — I42.9 CARDIOMYOPATHY, UNSPECIFIED TYPE: ICD-10-CM

## 2018-05-22 DIAGNOSIS — I77.1 TORTUOUS AORTA: ICD-10-CM

## 2018-05-22 DIAGNOSIS — F33.0 MDD (MAJOR DEPRESSIVE DISORDER), RECURRENT EPISODE, MILD: ICD-10-CM

## 2018-05-22 DIAGNOSIS — D50.0 IRON DEFICIENCY ANEMIA DUE TO CHRONIC BLOOD LOSS: ICD-10-CM

## 2018-05-22 DIAGNOSIS — I70.0 ATHEROSCLEROSIS OF AORTA: ICD-10-CM

## 2018-05-22 DIAGNOSIS — Z13.5 SCREENING FOR EYE CONDITION: ICD-10-CM

## 2018-05-22 DIAGNOSIS — R91.1 LUNG NODULE: ICD-10-CM

## 2018-05-22 DIAGNOSIS — I10 BENIGN HYPERTENSION: ICD-10-CM

## 2018-05-22 DIAGNOSIS — I10 ESSENTIAL HYPERTENSION: ICD-10-CM

## 2018-05-22 LAB
ALBUMIN SERPL BCP-MCNC: 3.5 G/DL
ALP SERPL-CCNC: 49 U/L
ALT SERPL W/O P-5'-P-CCNC: 6 U/L
ANION GAP SERPL CALC-SCNC: 9 MMOL/L
AST SERPL-CCNC: 10 U/L
BASOPHILS # BLD AUTO: 0.05 K/UL
BASOPHILS NFR BLD: 0.7 %
BILIRUB SERPL-MCNC: 0.4 MG/DL
BUN SERPL-MCNC: 21 MG/DL
CALCIUM SERPL-MCNC: 9.3 MG/DL
CHLORIDE SERPL-SCNC: 102 MMOL/L
CHOLEST SERPL-MCNC: 183 MG/DL
CHOLEST/HDLC SERPL: 4.2 {RATIO}
CO2 SERPL-SCNC: 29 MMOL/L
CREAT SERPL-MCNC: 1 MG/DL
DIFFERENTIAL METHOD: ABNORMAL
EOSINOPHIL # BLD AUTO: 0.2 K/UL
EOSINOPHIL NFR BLD: 2.8 %
ERYTHROCYTE [DISTWIDTH] IN BLOOD BY AUTOMATED COUNT: 17.6 %
EST. GFR  (AFRICAN AMERICAN): 56.9 ML/MIN/1.73 M^2
EST. GFR  (NON AFRICAN AMERICAN): 49.4 ML/MIN/1.73 M^2
ESTIMATED AVG GLUCOSE: 169 MG/DL
GLUCOSE SERPL-MCNC: 211 MG/DL
HBA1C MFR BLD HPLC: 7.5 %
HCT VFR BLD AUTO: 31.4 %
HDLC SERPL-MCNC: 44 MG/DL
HDLC SERPL: 24 %
HGB BLD-MCNC: 8.3 G/DL
IMM GRANULOCYTES # BLD AUTO: 0.02 K/UL
IMM GRANULOCYTES NFR BLD AUTO: 0.3 %
IRON SERPL-MCNC: 23 UG/DL
LDLC SERPL CALC-MCNC: 91 MG/DL
LYMPHOCYTES # BLD AUTO: 1.3 K/UL
LYMPHOCYTES NFR BLD: 18.7 %
MCH RBC QN AUTO: 20.5 PG
MCHC RBC AUTO-ENTMCNC: 26.4 G/DL
MCV RBC AUTO: 78 FL
MONOCYTES # BLD AUTO: 0.6 K/UL
MONOCYTES NFR BLD: 7.7 %
NEUTROPHILS # BLD AUTO: 5 K/UL
NEUTROPHILS NFR BLD: 69.8 %
NONHDLC SERPL-MCNC: 139 MG/DL
NRBC BLD-RTO: 0 /100 WBC
PLATELET # BLD AUTO: 305 K/UL
PMV BLD AUTO: 9.9 FL
POTASSIUM SERPL-SCNC: 4.6 MMOL/L
PROT SERPL-MCNC: 7.1 G/DL
RBC # BLD AUTO: 4.04 M/UL
SATURATED IRON: 5 %
SODIUM SERPL-SCNC: 140 MMOL/L
T4 FREE SERPL-MCNC: 1.13 NG/DL
TOTAL IRON BINDING CAPACITY: 487 UG/DL
TRANSFERRIN SERPL-MCNC: 329 MG/DL
TRIGL SERPL-MCNC: 240 MG/DL
TSH SERPL DL<=0.005 MIU/L-ACNC: 0.58 UIU/ML
VIT B12 SERPL-MCNC: 353 PG/ML
WBC # BLD AUTO: 7.12 K/UL

## 2018-05-22 PROCEDURE — 84439 ASSAY OF FREE THYROXINE: CPT

## 2018-05-22 PROCEDURE — 99499 UNLISTED E&M SERVICE: CPT | Mod: S$GLB,,, | Performed by: NURSE PRACTITIONER

## 2018-05-22 PROCEDURE — 82607 VITAMIN B-12: CPT

## 2018-05-22 PROCEDURE — 80061 LIPID PANEL: CPT

## 2018-05-22 PROCEDURE — 83540 ASSAY OF IRON: CPT

## 2018-05-22 PROCEDURE — 36415 COLL VENOUS BLD VENIPUNCTURE: CPT | Mod: PO

## 2018-05-22 PROCEDURE — G0439 PPPS, SUBSEQ VISIT: HCPCS | Mod: S$GLB,,, | Performed by: NURSE PRACTITIONER

## 2018-05-22 PROCEDURE — 99999 PR PBB SHADOW E&M-EST. PATIENT-LVL V: CPT | Mod: PBBFAC,,, | Performed by: NURSE PRACTITIONER

## 2018-05-22 PROCEDURE — 84443 ASSAY THYROID STIM HORMONE: CPT

## 2018-05-22 PROCEDURE — 85025 COMPLETE CBC W/AUTO DIFF WBC: CPT

## 2018-05-22 PROCEDURE — 80053 COMPREHEN METABOLIC PANEL: CPT

## 2018-05-22 PROCEDURE — 83036 HEMOGLOBIN GLYCOSYLATED A1C: CPT

## 2018-05-22 NOTE — Clinical Note
Primary Care Providers: Kenneth Panda MD, MD (General)  Your patient was seen today for a HRA visit. Gap(s) in care (HEDIS gaps) have been identified during this visit that require additional testing and possible follow up.  Orders Placed This Encounter     Ambulatory referral to ENT         Referral Priority:Routine         Referral Type:Consultation         Referral Reason:Specialty Services Required         Requested Specialty:Otolaryngology         Number of Visits Requested:1     Ambulatory Referral to Ophthalmology         Referral Priority:Routine         Referral Type:Consultation         Referral Reason:Specialty Services Required         Requested Specialty:Ophthalmology         Number of Visits Requested:1   These orders were placed using [facility] approved protocol and any results will be forwarded to your office for appropriate follow up. I have included a copy of my visit note; please review the note and feel free to contact me with any questions.   Thank you for allowing

## 2018-05-22 NOTE — PROGRESS NOTES
"Sachin Birch presented for a  Medicare AWV and comprehensive Health Risk Assessment today. The following components were reviewed and updated:    · Medical history  · Family History  · Social history  · Allergies and Current Medications  · Health Risk Assessment  · Health Maintenance  · Care Team     ** See Completed Assessments for Annual Wellness Visit within the encounter summary.**       The following assessments were completed:  · Living Situation-RESIDES ALONE. DAUGHTER CHIOMA HEATH PRESENT TODAY  · CAGE  · Depression Screening  · Timed Get Up and Go  · Whisper Test  · Cognitive Function Screening-ABNORMAL FINDINGS WITH CLOCK DRAWINGTEST  · Nutrition Screening  · ADL Screening  · PAQ Screening    Vitals:    05/22/18 0928   BP: 120/64   Pulse: 70   Resp: 15   Temp: 98.1 °F (36.7 °C)   TempSrc: Oral   Weight: 61.7 kg (136 lb)   Height: 5' 1" (1.549 m)     Body mass index is 25.7 kg/m².  Physical Exam   Constitutional: She is oriented to person, place, and time. She appears well-developed and well-nourished.   HENT:   Head: Normocephalic and atraumatic.   Wax impaction R>L   Eyes: No scleral icterus.   Cardiovascular: Normal rate, regular rhythm and normal heart sounds.    No murmur heard.  Pulmonary/Chest: Effort normal and breath sounds normal.   Abdominal: Soft. Bowel sounds are normal. There is no tenderness.   Musculoskeletal: She exhibits no edema.   Steady gait , no need for assistive device   Neurological: She is alert and oriented to person, place, and time.   Skin: Skin is warm and dry.   Psychiatric: She has a normal mood and affect. Her behavior is normal. Judgment normal.   CLOCK DRAWING TEST  DONE- ABNORMAL FINDINGS    Nursing note and vitals reviewed.        Diagnoses and health risks identified today and associated recommendations/orders:    1. Essential hypertension  STABLE- FOLLOWED BY PCP    2. Coronary artery disease involving native coronary artery of native heart without angina " pectoris  STABLE- FOLLOWED BY PCP    3. PUD (peptic ulcer disease)  STABLE- FOLLOWED BY PCP- denies black or red stools    4. Atrial flutter, unspecified type  STABLE- FOLLOWED BY PCP    5. Lung nodule  STABLE- FOLLOWED BY PCP    6. Type 2 diabetes mellitus without complication, without long-term current use of insulin  STABLE- FOLLOWED BY PCP    7. Cardiomyopathy, unspecified type  STABLE- FOLLOWED BY PCP    8. Tortuous aorta  STABLE- FOLLOWED BY PCP    9. Atherosclerosis of aorta  STABLE- FOLLOWED BY PCP    10. Nonrheumatic aortic valve insufficiency  STABLE- FOLLOWED BY PCP    11. MDD (major depressive disorder), recurrent episode, mild  STABLE- FOLLOWED BY PCP    12. Iron deficiency anemia due to chronic blood loss  STABLE- FOLLOWED BY PCP    13. Encounter for preventive health examination  Assessments completed  Preventative health recommendations reviewed       14. Bilateral impacted cerumen R>L  - Ambulatory referral to ENT    15. Screening for eye condition  - Ambulatory Referral to Ophthalmology- DR Barajas    16. Uncontrolled type 2 diabetes mellitus with complication, without long-term current use of insulin  STABLE- FOLLOWED BY PCP    17. Memory changes  STABLE- FOLLOWED BY PCP    18. Hyperlipidemia associated with type 2 diabetes mellitus  STABLE- FOLLOWED BY PCP      Provided [place] with a 5-10 year written screening schedule and personal prevention plan. Recommendations were developed using the USPSTF age appropriate recommendations. Education, counseling, and referrals were provided as needed. After Visit Summary printed and given to patient which includes a list of additional screenings\tests needed. Declined dxa. Fall precautions reviewed. Advanced directive handout given. Audiology test in future if decrease hearing persists after ear wax removal.    Follow-up in about 1 year (around 5/22/2019) for hra.    Orquidea Thomas NP

## 2018-05-22 NOTE — PROGRESS NOTES
I offered to discuss end of life issues, including information on how to make advance directives that the patient could use to name someone who would make medical decisions on their behalf if they became too ill to make themselves.    X- DAUGHTER & PT PROVIDED WITH HANDOUTS ON ADVANCED DIRECTIVES.

## 2018-05-22 NOTE — PATIENT INSTRUCTIONS
Counseling and Referral of Other Preventative  (Italic type indicates deductible and co-insurance are waived)    Patient Name: Sachin Birch  Today's Date: 5/22/2018    Health Maintenance       Date Due Completion Date    Hemoglobin A1c DONE TODAY   11/20/2017    Foot Exam 05/23/2018 5/23/2017    TETANUS VACCINE IN FUTURE FOR INJURY   ---    Pneumococcal (65+) (2 of 2 - PCV13) CHECK WITH PCP   12/7/2015    Eye Exam DUE   2/19/2015 (Done)    Override on 2/19/2015: Done (approx date- dr ward per daughter)    Lipid Panel DONE TODAY   5/24/2017    Influenza Vaccine 08/01/2018 IN FALL   11/20/2017          DECLINED BONE DENSITY * MAMMOGRAM.  WAX -REFERRAL TO ENT  The following information is provided to all patients.  This information is to help you find resources for any of the problems found today that may be affecting your health:                Living healthy guide: www.[facility].[place].gov      Understanding Diabetes: www.diabetes.org      Eating healthy: www.cdc.gov/healthyweight      CDC home safety checklist: www.cdc.gov/steadi/patient.html      Agency on Aging: www.goea.[place].[facility]      Alcoholics anonymous (AA): www.aa.org      Physical Activity: www.kal.nih.gov/wz3npzj      Tobacco use: www.quitwithusla.org

## 2018-05-29 ENCOUNTER — OFFICE VISIT (OUTPATIENT)
Dept: INTERNAL MEDICINE | Facility: CLINIC | Age: 83
End: 2018-05-29
Payer: MEDICARE

## 2018-05-29 VITALS
HEART RATE: 71 BPM | HEIGHT: 61 IN | WEIGHT: 134.5 LBS | RESPIRATION RATE: 18 BRPM | DIASTOLIC BLOOD PRESSURE: 62 MMHG | BODY MASS INDEX: 25.39 KG/M2 | TEMPERATURE: 98 F | SYSTOLIC BLOOD PRESSURE: 165 MMHG

## 2018-05-29 DIAGNOSIS — D50.0 IRON DEFICIENCY ANEMIA DUE TO CHRONIC BLOOD LOSS: ICD-10-CM

## 2018-05-29 DIAGNOSIS — I10 ESSENTIAL HYPERTENSION: ICD-10-CM

## 2018-05-29 DIAGNOSIS — I70.0 ATHEROSCLEROSIS OF AORTA: ICD-10-CM

## 2018-05-29 DIAGNOSIS — I25.10 CORONARY ARTERY DISEASE INVOLVING NATIVE CORONARY ARTERY OF NATIVE HEART WITHOUT ANGINA PECTORIS: ICD-10-CM

## 2018-05-29 DIAGNOSIS — E11.69 HYPERLIPIDEMIA ASSOCIATED WITH TYPE 2 DIABETES MELLITUS: ICD-10-CM

## 2018-05-29 DIAGNOSIS — Z00.00 ANNUAL PHYSICAL EXAM: Primary | ICD-10-CM

## 2018-05-29 DIAGNOSIS — K27.9 PUD (PEPTIC ULCER DISEASE): ICD-10-CM

## 2018-05-29 DIAGNOSIS — R82.90 ABNORMAL URINALYSIS: ICD-10-CM

## 2018-05-29 DIAGNOSIS — E78.5 HYPERLIPIDEMIA ASSOCIATED WITH TYPE 2 DIABETES MELLITUS: ICD-10-CM

## 2018-05-29 DIAGNOSIS — E11.9 TYPE 2 DIABETES MELLITUS WITHOUT COMPLICATION, WITHOUT LONG-TERM CURRENT USE OF INSULIN: ICD-10-CM

## 2018-05-29 DIAGNOSIS — I35.1 NONRHEUMATIC AORTIC VALVE INSUFFICIENCY: ICD-10-CM

## 2018-05-29 PROCEDURE — 87088 URINE BACTERIA CULTURE: CPT

## 2018-05-29 PROCEDURE — 87186 SC STD MICRODIL/AGAR DIL: CPT

## 2018-05-29 PROCEDURE — 99999 PR PBB SHADOW E&M-EST. PATIENT-LVL III: CPT | Mod: PBBFAC,,, | Performed by: INTERNAL MEDICINE

## 2018-05-29 PROCEDURE — 99397 PER PM REEVAL EST PAT 65+ YR: CPT | Mod: S$GLB,,, | Performed by: INTERNAL MEDICINE

## 2018-05-29 PROCEDURE — 87086 URINE CULTURE/COLONY COUNT: CPT

## 2018-05-29 PROCEDURE — 87077 CULTURE AEROBIC IDENTIFY: CPT

## 2018-05-29 RX ORDER — METFORMIN HYDROCHLORIDE 500 MG/1
500 TABLET ORAL
Qty: 90 TABLET | Refills: 3 | Status: SHIPPED | OUTPATIENT
Start: 2018-05-29 | End: 2018-06-26 | Stop reason: SDUPTHER

## 2018-05-29 NOTE — PROGRESS NOTES
Subjective:       Patient ID: Sachin Birch is a 89 y.o. female.    Chief Complaint: Annual Exam  HISTORY OF PRESENT ILLNESS:  A 89-year-old  female well known to me,   brought in by her daughter and she has been doing quite well.  She has had a   decline in her hearing.  No ringing in her ears.  She had blood work done prior   to this visit, which showed reactive changes in her urine.  Glucose was 211,   hematocrit was 31 and she had a prior history of GI bleeding.  I also ordered   iron levels, which were low and her B12 levels were normal.    PHYSICAL EXAMINATION:  VITAL SIGNS:  Normal except her blood pressure elevated 165/62, so I asked her   to monitor that.  SKIN:  Fair and healthy.  HEENT:  She has bilateral ear wax with near obstruction on both sides.  The   throat is clear.  NECK:  Shows no stiffness or adenopathy.  CHEST:  Clear.  HEART:  There is no murmur or gallop.  It is somewhat irregular.  ABDOMEN:  Nontender without any organomegaly.  BREASTS:  Examined.  She has no mass to palpation.  EXTREMITIES:  Show no edema.  She has minor osteoarthritic changes.  No pain.    She has no edema.  Foot exam, she has no sensory loss.  She has good pulses.  No   break in the skin.  Nails were okay.  No swelling.  NEUROLOGIC:  Otherwise, okay.  She has had a prior stroke with very good   recovery.    IMPRESSION:  1.  Diabetes, poor control.  She is not following a diet.  I talked to her   daughter about getting her to cut out some of the carbohydrates, particularly   the sweets.  2.  Generalized vascular disease with atherosclerosis of the aorta, coronary   disease.  3.  Prior history of peptic ulcer disease with bleeding.  4.  Anemia, iron deficient, for which I told her to get on a multivitamin with   iron every day.  5.  Hypertension with hypertensive readings.  They are going to monitor that at   home.  6.  Decreased hearing with wax in both ears, arranged for her to see ENT.  7.  Prior history of stroke  with good recovery.  8.  Abnormal urinalysis.  I have ordered a urine culture on her.  I will see her   again in six months if all is well.      JDC/HN  dd: 05/29/2018 18:37:48 (CDT)  td: 05/30/2018 12:23:42 (CDT)  Doc ID   #[id number]  Job ID #[id number]    CC:     [facility] [id number]  HPI  Review of Systems   Constitutional: Negative.    HENT: Positive for hearing loss. Negative for congestion, sinus pressure, sneezing, sore throat and voice change.    Eyes: Negative for visual disturbance.   Respiratory: Negative for cough, chest tightness and shortness of breath.    Cardiovascular: Negative.  Negative for chest pain, palpitations and leg swelling.   Gastrointestinal: Negative.    Genitourinary: Negative for difficulty urinating, dysuria, flank pain, frequency, hematuria, menstrual problem, pelvic pain, urgency, vaginal bleeding and vaginal discharge.   Musculoskeletal: Negative.  Negative for neck pain and neck stiffness.   Skin: Negative.    Neurological: Negative.  Negative for dizziness, seizures, light-headedness, numbness and headaches.   Psychiatric/Behavioral: Negative for agitation, behavioral problems, confusion and sleep disturbance. The patient is not nervous/anxious.        Objective:      Physical Exam   Constitutional: She is oriented to person, place, and time. She appears well-developed and well-nourished.   HENT:   Head: Normocephalic.   Mouth/Throat: Oropharynx is clear and moist.   Eyes: EOM are normal. Pupils are equal, round, and reactive to light.   Neck: Normal range of motion. Neck supple. No JVD present. No thyromegaly present.   Cardiovascular: Normal rate, regular rhythm, normal heart sounds and intact distal pulses.  Exam reveals no gallop.    No murmur heard.  Pulmonary/Chest: Breath sounds normal. She has no wheezes. She has no rales.   Abdominal: Soft. Bowel sounds are normal. She exhibits no mass. There is no tenderness.   Musculoskeletal: Normal range of motion.   Lymphadenopathy:     She has  no cervical adenopathy.   Neurological: She is alert and oriented to person, place, and time. She has normal reflexes. No cranial nerve deficit.   Skin: No rash noted. No erythema.   Psychiatric: She has a normal mood and affect. Judgment normal.       Assessment:       1. Annual physical exam    2. Essential hypertension    3. Coronary artery disease involving native coronary artery of native heart without angina pectoris    4. PUD (peptic ulcer disease)    5. Type 2 diabetes mellitus without complication, without long-term current use of insulin    6. Atherosclerosis of aorta    7. Nonrheumatic aortic valve insufficiency    8. Hyperlipidemia associated with type 2 diabetes mellitus    9. Iron deficiency anemia due to chronic blood loss    10. Abnormal urinalysis        Plan:

## 2018-05-31 LAB — BACTERIA UR CULT: NORMAL

## 2018-06-01 ENCOUNTER — OFFICE VISIT (OUTPATIENT)
Dept: CARDIOLOGY | Facility: CLINIC | Age: 83
End: 2018-06-01
Payer: MEDICARE

## 2018-06-01 VITALS
SYSTOLIC BLOOD PRESSURE: 163 MMHG | HEIGHT: 61 IN | WEIGHT: 136.88 LBS | DIASTOLIC BLOOD PRESSURE: 72 MMHG | HEART RATE: 70 BPM | BODY MASS INDEX: 25.84 KG/M2 | OXYGEN SATURATION: 92 %

## 2018-06-01 DIAGNOSIS — I42.9 CARDIOMYOPATHY, UNSPECIFIED TYPE: ICD-10-CM

## 2018-06-01 DIAGNOSIS — I35.1 NONRHEUMATIC AORTIC VALVE INSUFFICIENCY: ICD-10-CM

## 2018-06-01 DIAGNOSIS — E11.69 HYPERLIPIDEMIA ASSOCIATED WITH TYPE 2 DIABETES MELLITUS: ICD-10-CM

## 2018-06-01 DIAGNOSIS — I10 ESSENTIAL HYPERTENSION: ICD-10-CM

## 2018-06-01 DIAGNOSIS — I25.10 CORONARY ARTERY DISEASE INVOLVING NATIVE CORONARY ARTERY OF NATIVE HEART WITHOUT ANGINA PECTORIS: Primary | Chronic | ICD-10-CM

## 2018-06-01 DIAGNOSIS — E78.00 TYPE 2 DIABETES MELLITUS WITH HYPERCHOLESTEROLEMIA: ICD-10-CM

## 2018-06-01 DIAGNOSIS — E78.5 HYPERLIPIDEMIA ASSOCIATED WITH TYPE 2 DIABETES MELLITUS: ICD-10-CM

## 2018-06-01 DIAGNOSIS — E11.69 TYPE 2 DIABETES MELLITUS WITH HYPERCHOLESTEROLEMIA: ICD-10-CM

## 2018-06-01 PROCEDURE — 99214 OFFICE O/P EST MOD 30 MIN: CPT | Mod: S$GLB,,, | Performed by: NURSE PRACTITIONER

## 2018-06-01 PROCEDURE — 99999 PR PBB SHADOW E&M-EST. PATIENT-LVL III: CPT | Mod: PBBFAC,,, | Performed by: NURSE PRACTITIONER

## 2018-06-01 RX ORDER — AMLODIPINE BESYLATE 10 MG/1
TABLET ORAL
Qty: 90 TABLET | Refills: 3 | Status: SHIPPED | OUTPATIENT
Start: 2018-06-01 | End: 2019-02-18 | Stop reason: SDUPTHER

## 2018-06-01 NOTE — PROGRESS NOTES
Ms. Birch is a patient of Dr. Liu and was last seen in [facility] Cardiology Visit 6/7/2017.    Subjective:   Patient ID:  Sachin Birch is a 89 y.o. female who presents for follow-up of Coronary artery disease involving native coronary artery of  (1 yr fu)    Problems:  HTN  HLD  CAD presumed (SPECT study revealed fixed defects in the anterior and lateral walls)  Cardiomyopathy recovered from EF 25% to EF 55-60%  Aortic Insufficiency, mild to moderate  DM type II  Atrial fibrillation (spontaneous conversion after diuresis for systolic HF in 2013)  -event monitor in 6/2017, no Afib events  Atrial flutter    HPI  Ms. Birch is in clinic today for routine follow up.  Hypertension is treated with amlodipine 5mg, losartan 100mg, and carvedilol 25mg BID.  Reports following a low salt diet. Home blood pressure readings are 140-150s.  She took her medication about an hour before her visit.  Patient denies chest pain with exertion or at rest, palpitations, SOB, HERNANDEZ, dizziness, syncope, edema, orthopnea, PND, or claudication.  Reports routine exercise, walking almost daily but at a slow pace.  She is treated with low dose ASA and high intensity statin.  Patient is taking atorvastatin 40mg and LDL is 91.   Atrial fibrillation during an admission in 2013 without recurrence. She spontaneously converted and her event monitor last year did not show any atrial fibrillation events.  She is not currently anticoagulated.      Review of Systems   Constitution: Negative for decreased appetite, diaphoresis, weakness, malaise/fatigue, weight gain and weight loss.   Eyes: Negative for visual disturbance.   Cardiovascular: Negative for chest pain, claudication, dyspnea on exertion, irregular heartbeat, leg swelling, near-syncope, orthopnea, palpitations, paroxysmal nocturnal dyspnea and syncope.        Denies chest pressure   Respiratory: Negative for cough, hemoptysis, shortness of breath, sleep disturbances due to breathing and snoring.   "  Endocrine: Negative for cold intolerance and heat intolerance.   Hematologic/Lymphatic: Negative for bleeding problem. Does not bruise/bleed easily.   Musculoskeletal: Negative for myalgias.   Gastrointestinal: Negative for bloating, abdominal pain, anorexia, change in bowel habit, constipation, diarrhea, nausea and vomiting.   Neurological: Negative for difficulty with concentration, disturbances in coordination, excessive daytime sleepiness, dizziness, headaches, light-headedness, loss of balance and numbness.   Psychiatric/Behavioral: The patient does not have insomnia.        Allergies and current medications updated and reviewed:  Review of patient's allergies indicates:  No Known Allergies  Current Outpatient Prescriptions   Medication Sig    amlodipine (NORVASC) 5 MG tablet TAKE 1 TABLET ONE TIME DAILY    aspirin 81 MG Chew Take 1 tablet (81 mg total) by mouth once daily.    atorvastatin (LIPITOR) 40 MG tablet Take 1 tablet (40 mg total) by mouth once daily.    benazepril (LOTENSIN) 40 MG tablet Take 1 tablet (40 mg total) by mouth every morning.    CALCIUM CARBONATE/VITAMIN D3 (CALCIUM 500 + D, D3, ORAL) Take 1 tablet by mouth Daily.     carvedilol (COREG) 25 MG tablet Take 1 tablet (25 mg total) by mouth 2 (two) times daily with meals.    metFORMIN (GLUCOPHAGE) 500 MG tablet Take 1 tablet (500 mg total) by mouth daily with breakfast.    pantoprazole (PROTONIX) 40 MG tablet TAKE 1 TABLET EVERY DAY     No current facility-administered medications for this visit.      Objective:     Right Arm BP - Sittin/71 (18 0913)  Left Arm BP - Sittin/72 (18 0913)    BP (!) 163/72 (BP Location: Left arm, Patient Position: Sitting, BP Method: Large (Automatic))   Pulse 70   Ht 5' 1" (1.549 m)   Wt 62.1 kg (136 lb 14.5 oz)   SpO2 (!) 92%   BMI 25.87 kg/m²     Physical Exam   Constitutional: She is oriented to person, place, and time. Vital signs are normal. She appears well-developed " and well-nourished. She is active. No distress.   HENT:   Head: Normocephalic and atraumatic.   Eyes: Conjunctivae and lids are normal. No scleral icterus.   Neck: Neck supple. Normal carotid pulses, no hepatojugular reflux and no JVD present. Carotid bruit is not present.   Cardiovascular: Normal rate, regular rhythm, S1 normal, S2 normal and intact distal pulses.  PMI is not displaced.  Exam reveals no gallop and no friction rub.    No murmur heard.  Pulses:       Carotid pulses are 2+ on the right side, and 2+ on the left side.       Radial pulses are 2+ on the right side, and 2+ on the left side.        Dorsalis pedis pulses are 2+ on the right side, and 2+ on the left side.        Posterior tibial pulses are 1+ on the right side, and 1+ on the left side.   Pulmonary/Chest: Effort normal and breath sounds normal. No respiratory distress. She has no decreased breath sounds. She has no wheezes. She has no rhonchi. She has no rales. She exhibits no tenderness.   Abdominal: Soft. Normal appearance and bowel sounds are normal. She exhibits no distension, no fluid wave, no abdominal bruit, no ascites and no pulsatile midline mass. There is no hepatosplenomegaly. There is no tenderness.   Musculoskeletal: She exhibits no edema.   Neurological: She is alert and oriented to person, place, and time. Gait normal.   Skin: Skin is warm, dry and intact. No rash noted. She is not diaphoretic. Nails show no clubbing.   Psychiatric: She has a normal mood and affect. Her speech is normal and behavior is normal. Judgment and thought content normal. Cognition and memory are normal.   Nursing note and vitals reviewed.      Chemistry        Component Value Date/Time     05/22/2018 0905    K 4.6 05/22/2018 0905     05/22/2018 0905    CO2 29 05/22/2018 0905    BUN 21 05/22/2018 0905    CREATININE 1.0 05/22/2018 0905     (H) 05/22/2018 0905        Component Value Date/Time    CALCIUM 9.3 05/22/2018 0905    ALKPHOS 49  (L) 05/22/2018 0905    AST 10 05/22/2018 0905    ALT 6 (L) 05/22/2018 0905    BILITOT 0.4 05/22/2018 0905    ESTGFRAFRICA 56.9 (A) 05/22/2018 0905    EGFRNONAA 49.4 (A) 05/22/2018 0905        Lab Results   Component Value Date    HGBA1C 7.5 (H) 05/22/2018       Recent Labs  Lab 05/22/18  0905   WHITE BLOOD CELL COUNT 7.12   HEMOGLOBIN 8.3 L   HEMATOCRIT 31.4 L   MCV 78 L   PLATELETS 305   TSH 0.580   CHOLESTEROL 183   HDL 44   LDL CHOLESTEROL 91.0   TRIGLYCERIDES 240 H   HDL/CHOLESTEROL RATIO 24.0     Lab Results   Component Value Date    IRON 23 (L) 05/22/2018    TIBC 487 (H) 05/22/2018    FERRITIN 19 (L) 04/29/2015            Test(s) Reviewed  I have reviewed the following in detail:  [] Stress test   [] Angiography   [x] Echocardiogram   [x] Labs   [] Other:       Assessment/Plan:     Coronary artery disease involving native coronary artery of native heart without angina pectoris  Comments:  Asymptomatic. Taking ASA and high intensity statin. No changes.    Essential hypertension  Comments:  BP not at goal <140/90. Increase amlodipine to 10mg. Instructed to send BP log in 2 weeks.  She has f/u with PCP in 3 weeks. Instructed to call if dizzy  Orders:  -     amLODIPine (NORVASC) 10 MG tablet; TAKE 1 TABLET ONE TIME DAILY  Dispense: 90 tablet; Refill: 3    Hyperlipidemia associated with type 2 diabetes mellitus  Comments:  LDL 91. Encouraged mediterranean diet. Continue atorvastatin 40mg.     Cardiomyopathy, unspecified type  Comments:  CM resolved. EF recovered from 25% to 55%.     Nonrheumatic aortic valve insufficiency    Type 2 diabetes mellitus with hypercholesterolemia  Comments:  A1C not at goal <7. Recently prescribed metformin by PCP. Not started yet because she has not yet received the prescription. F/U with PCP as planned.      Follow-up in about 1 year (around 6/1/2019). or sooner for any problems.

## 2018-06-01 NOTE — PATIENT INSTRUCTIONS
Increase your amlodipine from 5mg to 10mg.  You can take 2 of the 5mg tablets of amlodipine for a total of 10mg until you run out.  Then start your new prescription.

## 2018-06-04 ENCOUNTER — TELEPHONE (OUTPATIENT)
Dept: INTERNAL MEDICINE | Facility: CLINIC | Age: 83
End: 2018-06-04

## 2018-06-04 DIAGNOSIS — N39.0 ACUTE UTI: Primary | ICD-10-CM

## 2018-06-04 RX ORDER — CEPHALEXIN 500 MG/1
500 TABLET ORAL 3 TIMES DAILY
Qty: 30 TABLET | Refills: 0 | Status: SHIPPED | OUTPATIENT
Start: 2018-06-04 | End: 2018-06-14

## 2018-06-04 NOTE — TELEPHONE ENCOUNTER
Left msg on daughter cell that rx sent to [facility] for uti and sent mychart msg with more information.

## 2018-06-04 NOTE — TELEPHONE ENCOUNTER
----- Message from Kenneth Panda MD sent at 5/31/2018 11:21 AM CDT -----  She has uti and will await sensitivities

## 2018-06-06 ENCOUNTER — OFFICE VISIT (OUTPATIENT)
Dept: OPTOMETRY | Facility: CLINIC | Age: 83
End: 2018-06-06
Payer: COMMERCIAL

## 2018-06-06 DIAGNOSIS — E11.3293 TYPE 2 DIABETES MELLITUS WITH BOTH EYES AFFECTED BY MILD NONPROLIFERATIVE RETINOPATHY WITHOUT MACULAR EDEMA, WITHOUT LONG-TERM CURRENT USE OF INSULIN: Primary | ICD-10-CM

## 2018-06-06 DIAGNOSIS — I10 ESSENTIAL HYPERTENSION: ICD-10-CM

## 2018-06-06 DIAGNOSIS — H26.493 PCO (POSTERIOR CAPSULAR OPACIFICATION), BILATERAL: ICD-10-CM

## 2018-06-06 PROCEDURE — 92004 COMPRE OPH EXAM NEW PT 1/>: CPT | Mod: S$GLB,,, | Performed by: OPTOMETRIST

## 2018-06-06 PROCEDURE — 99999 PR PBB SHADOW E&M-EST. PATIENT-LVL II: CPT | Mod: PBBFAC,,, | Performed by: OPTOMETRIST

## 2018-06-06 NOTE — LETTER
June 6, 2018      Orquidea Thomas, NP  [address] [address]           [place] - [facility]  [address]  [place] [address]  Phone: [phone]  Fax: [phone]          Patient: Sachin Birch   MR Number: [id number]   YOB: 1926   Date of Visit: 6/6/2018       Dear Orquidea Thomas:    Thank you for referring Sachin Birch to me for evaluation. Attached you will find relevant portions of my assessment and plan of care.    If you have questions, please do not hesitate to call me. I look forward to following Sachin Birch along with you.    Sincerely,    Soraida Austin, OD    Enclosure  CC:  No Recipients    If you would like to receive this communication electronically, please contact [email or web address] or [phone] to request more information on [employer] Link access.    For providers and/or their staff who would like to refer a patient to [facility], please contact us through our one-stop-shop provider referral line, [facility] , at [phone].    If you feel you have received this communication in error or would no longer like to receive these types of communications, please e-mail [email or web address]

## 2018-06-06 NOTE — PROGRESS NOTES
VEDA SYKES: 3 years ago  Pt is here for a diabetic exam. Unsure of Lbs reading. Pt is also is also   hypertensive but says that it controlled with meds. Pt had cat sx 3 years   ago and does not wear any glasses.  (-)pain, irritation  (-)flashes, floaters  No gtts  Hemoglobin A1C       Date                     Value               Ref Range             Status                05/22/2018               7.5 (H)             4.0 - 5.6 %           Final                 11/20/2017               7.4 (H)             4.0 - 5.6 %           Final                  05/24/2017               7.4 (H)             4.5 - 6.2 %           Final                 ----------      Last edited by Soraida Austin, OD on 6/6/2018 10:23 AM. (History)            Assessment /Plan     For exam results, see Encounter Report.    Type 2 diabetes mellitus with both eyes affected by mild nonproliferative retinopathy without macular edema, without long-term current use of insulin    Essential hypertension    PCO (posterior capsular opacification), bilateral      1. (+)MA's and DBH OU. (-)NVI, NVD, NVE, CSME. Educated patient on the importance of bg control. Return in 1 year for dilated eye exam.  2  (-)CWS, papilledema. Continue compliance with PCP and meds. Recheck in 1 yr.   3. PCO OU. OS>OD. Pt denies increase in blur. Educated pt of today's findings. Discussed option for YAG in the future. Pt would like to monitor for now.    RTC in 1 year or prn.

## 2018-06-19 ENCOUNTER — LAB VISIT (OUTPATIENT)
Dept: LAB | Facility: HOSPITAL | Age: 83
End: 2018-06-19
Attending: INTERNAL MEDICINE
Payer: MEDICARE

## 2018-06-19 DIAGNOSIS — K76.89 LIVER CYST: ICD-10-CM

## 2018-06-19 DIAGNOSIS — D50.0 IRON DEFICIENCY ANEMIA DUE TO CHRONIC BLOOD LOSS: ICD-10-CM

## 2018-06-19 DIAGNOSIS — K27.9 PUD (PEPTIC ULCER DISEASE): ICD-10-CM

## 2018-06-19 LAB
ANION GAP SERPL CALC-SCNC: 9 MMOL/L
BASOPHILS # BLD AUTO: 0.06 K/UL
BASOPHILS NFR BLD: 1 %
BUN SERPL-MCNC: 16 MG/DL
CALCIUM SERPL-MCNC: 8.8 MG/DL
CHLORIDE SERPL-SCNC: 101 MMOL/L
CO2 SERPL-SCNC: 27 MMOL/L
CREAT SERPL-MCNC: 1 MG/DL
DIFFERENTIAL METHOD: ABNORMAL
EOSINOPHIL # BLD AUTO: 0.2 K/UL
EOSINOPHIL NFR BLD: 2.4 %
ERYTHROCYTE [DISTWIDTH] IN BLOOD BY AUTOMATED COUNT: 17.3 %
EST. GFR  (AFRICAN AMERICAN): 56.9 ML/MIN/1.73 M^2
EST. GFR  (NON AFRICAN AMERICAN): 49.4 ML/MIN/1.73 M^2
GLUCOSE SERPL-MCNC: 180 MG/DL
HCT VFR BLD AUTO: 31.6 %
HGB BLD-MCNC: 8.8 G/DL
IMM GRANULOCYTES # BLD AUTO: 0.02 K/UL
IMM GRANULOCYTES NFR BLD AUTO: 0.3 %
LYMPHOCYTES # BLD AUTO: 1.2 K/UL
LYMPHOCYTES NFR BLD: 20 %
MCH RBC QN AUTO: 21.7 PG
MCHC RBC AUTO-ENTMCNC: 27.8 G/DL
MCV RBC AUTO: 78 FL
MONOCYTES # BLD AUTO: 0.6 K/UL
MONOCYTES NFR BLD: 8.9 %
NEUTROPHILS # BLD AUTO: 4.2 K/UL
NEUTROPHILS NFR BLD: 67.4 %
NRBC BLD-RTO: 0 /100 WBC
PLATELET # BLD AUTO: 314 K/UL
PMV BLD AUTO: 10.3 FL
POTASSIUM SERPL-SCNC: 4.7 MMOL/L
RBC # BLD AUTO: 4.06 M/UL
SODIUM SERPL-SCNC: 137 MMOL/L
WBC # BLD AUTO: 6.21 K/UL

## 2018-06-19 PROCEDURE — 86790 VIRUS ANTIBODY NOS: CPT

## 2018-06-19 PROCEDURE — 86706 HEP B SURFACE ANTIBODY: CPT

## 2018-06-19 PROCEDURE — 85025 COMPLETE CBC W/AUTO DIFF WBC: CPT

## 2018-06-19 PROCEDURE — 80048 BASIC METABOLIC PNL TOTAL CA: CPT

## 2018-06-19 PROCEDURE — 36415 COLL VENOUS BLD VENIPUNCTURE: CPT | Mod: PO

## 2018-06-19 PROCEDURE — 80074 ACUTE HEPATITIS PANEL: CPT

## 2018-06-20 LAB
HAV IGM SERPL QL IA: NEGATIVE
HBV CORE IGM SERPL QL IA: NEGATIVE
HBV SURFACE AG SERPL QL IA: NEGATIVE
HCV AB SERPL QL IA: NEGATIVE
HEPATITIS A ANTIBODY, IGG: POSITIVE

## 2018-06-21 LAB
HEP. B SURF AB, QUAL: POSITIVE
HEP. B SURF AB, QUANT.: 526 MIU/ML

## 2018-06-26 ENCOUNTER — OFFICE VISIT (OUTPATIENT)
Dept: INTERNAL MEDICINE | Facility: CLINIC | Age: 83
End: 2018-06-26
Payer: MEDICARE

## 2018-06-26 VITALS
SYSTOLIC BLOOD PRESSURE: 150 MMHG | BODY MASS INDEX: 23.79 KG/M2 | RESPIRATION RATE: 18 BRPM | HEIGHT: 63 IN | DIASTOLIC BLOOD PRESSURE: 60 MMHG | WEIGHT: 134.25 LBS | TEMPERATURE: 98 F | HEART RATE: 65 BPM

## 2018-06-26 DIAGNOSIS — K27.9 PUD (PEPTIC ULCER DISEASE): ICD-10-CM

## 2018-06-26 DIAGNOSIS — I25.10 CORONARY ARTERY DISEASE INVOLVING NATIVE CORONARY ARTERY OF NATIVE HEART WITHOUT ANGINA PECTORIS: ICD-10-CM

## 2018-06-26 DIAGNOSIS — I10 ESSENTIAL HYPERTENSION: Primary | ICD-10-CM

## 2018-06-26 DIAGNOSIS — I70.0 ATHEROSCLEROSIS OF AORTA: ICD-10-CM

## 2018-06-26 DIAGNOSIS — D53.9 NUTRITIONAL ANEMIA: ICD-10-CM

## 2018-06-26 DIAGNOSIS — E11.9 TYPE 2 DIABETES MELLITUS WITHOUT COMPLICATION, WITHOUT LONG-TERM CURRENT USE OF INSULIN: ICD-10-CM

## 2018-06-26 PROCEDURE — 99499 UNLISTED E&M SERVICE: CPT | Mod: S$GLB,,, | Performed by: INTERNAL MEDICINE

## 2018-06-26 PROCEDURE — 99214 OFFICE O/P EST MOD 30 MIN: CPT | Mod: S$GLB,,, | Performed by: INTERNAL MEDICINE

## 2018-06-26 PROCEDURE — 99999 PR PBB SHADOW E&M-EST. PATIENT-LVL III: CPT | Mod: PBBFAC,,, | Performed by: INTERNAL MEDICINE

## 2018-06-26 RX ORDER — METFORMIN HYDROCHLORIDE 500 MG/1
1000 TABLET ORAL
Qty: 180 TABLET | Refills: 3 | Status: ON HOLD | OUTPATIENT
Start: 2018-06-26 | End: 2019-01-08 | Stop reason: SDUPTHER

## 2018-06-27 PROBLEM — D53.9 NUTRITIONAL ANEMIA: Status: ACTIVE | Noted: 2018-06-27

## 2018-06-28 NOTE — PROGRESS NOTES
Subjective:       Patient ID: Sachin Birch is a 89 y.o. female.    Chief Complaint: Follow-up (3 week f/u w/ labs) and Medication Refill  HISTORY OF PRESENT ILLNESS:  A 89-year-old  female who came in with her   daughter and had no complaints at the time of this visit and her daughter says   she has been doing fine.  They have been monitoring her blood pressure and has   been in a good range.  She has also begun to lose some weight.  She is not   really watching her diet very well.  Last time, I had checked her sugar, it was   180.  Her sugars at home run in the mid 100s.  She has also had some hepatitis   studies done for Dr. Anderson for some reason, which were all showing evidence   of immunization, but no active problems.    PHYSICAL EXAMINATION:  VITAL SIGNS:  Normal except her blood pressure is slightly high, but she also   just come in and they are monitoring that at home.  SKIN:  Fair and healthy.  CHEST:  Clear.  CARDIAC:  Rhythm is regular.  EXTREMITIES:  He has no ankle edema.    IMPRESSION:  1.  Improved blood pressure control.  2.  Diabetes, still needs better control.  We talked about that.  I will see her   again in six months if all is well.      JDC/HN  dd: 06/27/2018 22:42:24 (CDT)  td: 06/28/2018 15:44:11 (CDT)  Doc ID   #[id number]  Job ID #[id number]    CC:     [facility]  [id number]  [facility]  Review of Systems   Constitutional: Negative.    HENT: Negative for congestion, hearing loss, sinus pressure, sneezing, sore throat and voice change.    Eyes: Negative for visual disturbance.   Respiratory: Negative for cough, chest tightness and shortness of breath.    Cardiovascular: Negative.  Negative for chest pain, palpitations and leg swelling.   Gastrointestinal: Negative.    Genitourinary: Negative for difficulty urinating, dysuria, flank pain, frequency, hematuria, menstrual problem, pelvic pain, urgency, vaginal bleeding and vaginal discharge.   Musculoskeletal: Negative.  Negative for neck pain and neck  stiffness.   Skin: Negative.    Neurological: Negative.  Negative for dizziness, seizures, light-headedness, numbness and headaches.   Psychiatric/Behavioral: Negative for agitation, behavioral problems, confusion and sleep disturbance. The patient is not nervous/anxious.        Objective:      Physical Exam   Constitutional: She is oriented to person, place, and time. She appears well-developed and well-nourished.   Eyes: EOM are normal. Pupils are equal, round, and reactive to light.   Neck: Normal range of motion. Neck supple. No JVD present. No thyromegaly present.   Cardiovascular: Normal rate, regular rhythm, normal heart sounds and intact distal pulses.  Exam reveals no gallop.    No murmur heard.  Pulmonary/Chest: Breath sounds normal. She has no wheezes. She has no rales.   Abdominal: Soft. Bowel sounds are normal. She exhibits no mass. There is no tenderness.   Musculoskeletal: Normal range of motion.   Lymphadenopathy:     She has no cervical adenopathy.   Neurological: She is alert and oriented to person, place, and time. She has normal reflexes. No cranial nerve deficit.   Skin: No rash noted. No erythema.   Psychiatric: She has a normal mood and affect. Judgment normal.       Assessment:       1. Essential hypertension    2. Coronary artery disease involving native coronary artery of native heart without angina pectoris    3. PUD (peptic ulcer disease)    4. Type 2 diabetes mellitus without complication, without long-term current use of insulin    5. Atherosclerosis of aorta    6. Nutritional anemia         Plan:

## 2018-07-23 ENCOUNTER — OFFICE VISIT (OUTPATIENT)
Dept: OTOLARYNGOLOGY | Facility: CLINIC | Age: 83
End: 2018-07-23
Payer: MEDICARE

## 2018-07-23 ENCOUNTER — CLINICAL SUPPORT (OUTPATIENT)
Dept: AUDIOLOGY | Facility: CLINIC | Age: 83
End: 2018-07-23
Payer: MEDICARE

## 2018-07-23 VITALS
BODY MASS INDEX: 23.43 KG/M2 | DIASTOLIC BLOOD PRESSURE: 64 MMHG | WEIGHT: 132.25 LBS | SYSTOLIC BLOOD PRESSURE: 127 MMHG | HEART RATE: 66 BPM | HEIGHT: 63 IN

## 2018-07-23 DIAGNOSIS — H90.3 SENSORINEURAL HEARING LOSS (SNHL) OF BOTH EARS: Primary | ICD-10-CM

## 2018-07-23 DIAGNOSIS — H90.A31 MIXED CONDUCTIVE AND SENSORINEURAL HEARING LOSS OF RIGHT EAR WITH RESTRICTED HEARING OF LEFT EAR: ICD-10-CM

## 2018-07-23 DIAGNOSIS — H61.892: ICD-10-CM

## 2018-07-23 DIAGNOSIS — H90.A22 SENSORINEURAL HEARING LOSS (SNHL) OF LEFT EAR WITH RESTRICTED HEARING OF RIGHT EAR: ICD-10-CM

## 2018-07-23 DIAGNOSIS — H72.91 PERFORATION OF RIGHT TYMPANIC MEMBRANE: ICD-10-CM

## 2018-07-23 DIAGNOSIS — H92.11 OTORRHEA, RIGHT EAR: Primary | ICD-10-CM

## 2018-07-23 DIAGNOSIS — H61.22 IMPACTED CERUMEN, LEFT EAR: ICD-10-CM

## 2018-07-23 PROCEDURE — 99203 OFFICE O/P NEW LOW 30 MIN: CPT | Mod: 25,S$GLB,, | Performed by: OTOLARYNGOLOGY

## 2018-07-23 PROCEDURE — 69210 REMOVE IMPACTED EAR WAX UNI: CPT | Mod: S$GLB,,, | Performed by: OTOLARYNGOLOGY

## 2018-07-23 PROCEDURE — 99999 PR PBB SHADOW E&M-EST. PATIENT-LVL III: CPT | Mod: PBBFAC,,, | Performed by: OTOLARYNGOLOGY

## 2018-07-23 PROCEDURE — 92567 TYMPANOMETRY: CPT | Mod: S$GLB,,, | Performed by: AUDIOLOGIST

## 2018-07-23 PROCEDURE — 92557 COMPREHENSIVE HEARING TEST: CPT | Mod: S$GLB,,, | Performed by: AUDIOLOGIST

## 2018-07-23 PROCEDURE — 99999 PR PBB SHADOW E&M-EST. PATIENT-LVL I: CPT | Mod: PBBFAC,,,

## 2018-07-23 RX ORDER — CEPHALEXIN 500 MG/1
CAPSULE ORAL
Status: ON HOLD | COMMUNITY
Start: 2018-06-05 | End: 2019-01-05

## 2018-07-23 RX ORDER — CIPROFLOXACIN AND DEXAMETHASONE 3; 1 MG/ML; MG/ML
SUSPENSION/ DROPS AURICULAR (OTIC)
Qty: 7.5 ML | Refills: 2 | Status: SHIPPED | OUTPATIENT
Start: 2018-07-23 | End: 2019-01-05

## 2018-07-23 NOTE — LETTER
July 24, 2018      Orquidea Thomas, TRAY  [address]  [facility] [address]           [place] [facility]  [address]  [facility] [address]  Phone: [phone]  Fax: [phone]          Patient: Sachin Birch   MR Number: [id number]   YOB: 1926   Date of Visit: 7/23/2018       Dear Orquidea Thomas:    Thank you for referring Sachin Birch to me for evaluation. Attached you will find relevant portions of my assessment and plan of care.    If you have questions, please do not hesitate to call me. I look forward to following Sachin Birch along with you.    Sincerely,    Maurisio Lopez III, MD    Enclosure  CC:  No Recipients    If you would like to receive this communication electronically, please contact [email or web address] or [phone] to request more information on [employer] Link access.    For providers and/or their staff who would like to refer a patient to [facility], please contact us through our one-stop-shop provider referral line, [facility], at [phone].    If you feel you have received this communication in error or would no longer like to receive these types of communications, please e-mail [email or web address]

## 2018-07-23 NOTE — PROGRESS NOTES
Ms. Birch was seen in the clinic today for a hearing evaluation following cerumen removal.  She reported that she does not perceive difficulty with hearing but her daughter reported that she does not feel her mother hears well.     Audiological testing revealed a profound sensorineural hearing loss in the right ear and a mild to severe sensorineural hearing loss in the left ear. A speech reception threshold was obtained at 30 dBHL in the left ear. No response could be obtained in the right ear with the use of masking noise. Speech discrimination was 76% in the left ear and could not be tested in the right ear due to the extent of the hearing loss. It should be noted that the reliability of this audiogram is poor. Ms. Birch was re instructed multiple times. The documented responses were the ones obtained following reinstruction.     Tympanometry revealed a Type A tympanogram in the left ear. A hermetic seal could not be maintained for tympanometry in the right ear.     Recommendations:  1. Otologic evaluation  2. Annual hearing evaluation  3. Noise protection  4. Hearing aid consultation for the left ear

## 2018-07-23 NOTE — PROGRESS NOTES
"Subjective:       Patient ID: Sachin Birch is a 89 y.o. female.    Chief Complaint: Cerumen Impaction    HPI: Ms. Birch is a 89-year-old type II diabetic Chinese lady with peptic ulcer disease whose handwritten reason for the visit today is "hearing test, wax in ears".  She is accompanied by her daughter today.  According to her daughter, her mother never complains ( about anything) ; she has never complained of any significant hearing loss problem.  She was examined by Dr. Kenneth Panda 6/26/18 for a 3 week follow-up with labs.  She was diagnosed essential hypertension, coronary artery disease, peptic ulcer disease, type 2 diabetes, atherosclerosis of aorta and nutritional anemia.      PMH: High blood pressure, high cholesterol, diabetes, arthritis, atrial fibrillation, gastric ulcers  PSH: Hysterectomy 15 years ago  Occupation: Retired  ALLERGIES: None known  Review of Systems   Ears: Positive for hearing loss and family history of hearing loss.    Nose:  Positive for postnasal drip and snoring.    Eyes:  Positive for history of glaucoma.   Cardiovascular:  Positive for history of high blood pressure.   Gastrointestinal:  Positive for history of stomach ulcers or pain, acid reflux and blood in stool (4 years ago).   Other:  Negative for rash.     Current Outpatient Prescriptions on File Prior to Visit   Medication Sig Dispense Refill    amLODIPine (NORVASC) 10 MG tablet TAKE 1 TABLET ONE TIME DAILY 90 tablet 3    aspirin 81 MG Chew Take 1 tablet (81 mg total) by mouth once daily. 90 tablet 3    atorvastatin (LIPITOR) 40 MG tablet Take 1 tablet (40 mg total) by mouth once daily. 90 tablet 3    benazepril (LOTENSIN) 40 MG tablet Take 1 tablet (40 mg total) by mouth every morning. 90 tablet 3    CALCIUM CARBONATE/VITAMIN D3 (CALCIUM 500 + D, D3, ORAL) Take 1 tablet by mouth Daily.       carvedilol (COREG) 25 MG tablet Take 1 tablet (25 mg total) by mouth 2 (two) times daily with meals. 180 tablet 3    " metFORMIN (GLUCOPHAGE) 500 MG tablet Take 2 tablets (1,000 mg total) by mouth daily with breakfast. 180 tablet 3    pantoprazole (PROTONIX) 40 MG tablet TAKE 1 TABLET EVERY DAY 90 tablet 3     No current facility-administered medications on file prior to visit.      Her medical problem list includes memory changes, major depressive disorder, type 2 diabetes, cardiomyopathy, iron deficiency anemia, lung nodule, peptic ulcer disease, atrial flutter/ resolved) , essential hypertension, hyperlipidemia      The patient completed an audiometric study performed by the [facility] audiology service.  The study is duplicated below and the results are reviewed with she and her daughter in detail  Objective:       Blood pressure 127/64 pulse 66 height 5 feet 3 inches weight 132 pounds  Gen.: Alert and oriented very  quiet Chinese lady in no acute distress; her daughter acts as her .  Both ears were examined under the microscope in the micro-procedure room  Physical Exam   Constitutional: She is oriented to person, place, and time. She appears well-developed and well-nourished.   HENT:   Head: Normocephalic.   Right Ear: Tympanic membrane and external ear normal. No drainage. No foreign bodies. No mastoid tenderness. Tympanic membrane is not perforated. No decreased hearing is noted.   Left Ear: Tympanic membrane and external ear normal. No drainage. No foreign bodies. No mastoid tenderness. Tympanic membrane is not perforated. No decreased hearing is noted.   Ears:    Nose: Nose normal. No nasal deformity, septal deviation or nasal septal hematoma. No epistaxis. Right sinus exhibits no maxillary sinus tenderness and no frontal sinus tenderness. Left sinus exhibits no maxillary sinus tenderness and no frontal sinus tenderness.   Mouth/Throat: Uvula is midline, oropharynx is clear and moist and mucous membranes are normal. No oral lesions. No trismus in the jaw. No uvula swelling. No oropharyngeal  exudate or tonsillar abscesses.   Neck: Neck supple. No tracheal deviation present. No thyromegaly present.   Pulmonary/Chest: Effort normal. No stridor.   Lymphadenopathy:     She has no cervical adenopathy.   Neurological: She is alert and oriented to person, place, and time.   Skin: No rash noted.       Assessment:       1. Otorrhea, right ear    2. Perforation of right tympanic membrane    3. Impacted cerumen, left ear    4. Dry left ear canal    5. Sensorineural hearing loss (SNHL) of left ear with restricted hearing of right ear    6. Mixed conductive and sensorineural hearing loss of right ear with restricted hearing of left ear        Plan:     Keratin debris/some cerumen removed from deep AD eac  Dry C.I. removed from dry eac  Audiometry reviewed: AS SNHL ; AD hearing ?  Pt. is a candidate for amplification for the left ear ( repeat audiometric testing encouraged )   Copy of audiogram/[facility] Flower's card/Rx to obtain hearing aids provided   Rx for Ciprodex otic susp; 4 drops to right ear BID x 10-14 days  RTC 2-3 weeks prn  RTC 3 months   Avoid water/chemical exposure to right ear; use of earv plug recommended prn

## 2018-07-23 NOTE — PATIENT INSTRUCTIONS
Keratin debris/some cerumen removed from deep AD eac  Dry C.I. removed from dry eac  Audiometry reviewed: AS SNHL ; AD hearing ?  Pt. is a candidate for amplification for the left ear ( repeat audiometric testing encouraged )   Copy of audiogram/[facility] F[facility]'s card/Rx to obtain hearing aids provided   Rx for Ciprodex otic susp; 4 drops to right ear BID x 10-14 days  RTC 2-3 weeks prn  RTC 3 months   Avoid water/chemical exposure to right ear; use of earv plug recommended prn

## 2018-07-30 RX ORDER — ATORVASTATIN CALCIUM 40 MG/1
TABLET, FILM COATED ORAL
Qty: 90 TABLET | Refills: 3 | Status: SHIPPED | OUTPATIENT
Start: 2018-07-30 | End: 2019-01-01 | Stop reason: SDUPTHER

## 2018-09-12 ENCOUNTER — TELEPHONE (OUTPATIENT)
Dept: INTERNAL MEDICINE | Facility: CLINIC | Age: 83
End: 2018-09-12

## 2018-09-12 DIAGNOSIS — N39.0 URINARY TRACT INFECTION WITHOUT HEMATURIA, SITE UNSPECIFIED: Primary | ICD-10-CM

## 2018-09-12 NOTE — TELEPHONE ENCOUNTER
----- Message from Jacqueline Vickers MA sent at 6/4/2018  4:43 PM CDT -----  Due for 3 month repeat urinalysis and culture.  Call and arrange.

## 2018-09-12 NOTE — TELEPHONE ENCOUNTER
I reached daughter celestina and we set up urine testing for Friday.  Advised no fasting required. Just drink water prior to arrival so can urinate.

## 2018-09-14 ENCOUNTER — LAB VISIT (OUTPATIENT)
Dept: LAB | Facility: HOSPITAL | Age: 83
End: 2018-09-14
Attending: INTERNAL MEDICINE
Payer: MEDICARE

## 2018-09-14 DIAGNOSIS — N39.0 URINARY TRACT INFECTION WITHOUT HEMATURIA, SITE UNSPECIFIED: ICD-10-CM

## 2018-09-14 LAB
BACTERIA #/AREA URNS AUTO: ABNORMAL /HPF
BILIRUB UR QL STRIP: NEGATIVE
CLARITY UR REFRACT.AUTO: ABNORMAL
COLOR UR AUTO: YELLOW
GLUCOSE UR QL STRIP: NEGATIVE
HGB UR QL STRIP: NEGATIVE
HYALINE CASTS UR QL AUTO: 7 /LPF
KETONES UR QL STRIP: NEGATIVE
LEUKOCYTE ESTERASE UR QL STRIP: ABNORMAL
MICROSCOPIC COMMENT: ABNORMAL
NITRITE UR QL STRIP: NEGATIVE
PH UR STRIP: 5 [PH] (ref 5–8)
PROT UR QL STRIP: NEGATIVE
RBC #/AREA URNS AUTO: 6 /HPF (ref 0–4)
SP GR UR STRIP: 1.02 (ref 1–1.03)
SQUAMOUS #/AREA URNS AUTO: 3 /HPF
URN SPEC COLLECT METH UR: ABNORMAL
UROBILINOGEN UR STRIP-ACNC: NEGATIVE EU/DL
WBC #/AREA URNS AUTO: 79 /HPF (ref 0–5)

## 2018-09-14 PROCEDURE — 87184 SC STD DISK METHOD PER PLATE: CPT

## 2018-09-14 PROCEDURE — 87086 URINE CULTURE/COLONY COUNT: CPT

## 2018-09-14 PROCEDURE — 81001 URINALYSIS AUTO W/SCOPE: CPT

## 2018-09-14 PROCEDURE — 87186 SC STD MICRODIL/AGAR DIL: CPT | Mod: 59

## 2018-09-14 PROCEDURE — 87077 CULTURE AEROBIC IDENTIFY: CPT

## 2018-09-14 PROCEDURE — 87088 URINE BACTERIA CULTURE: CPT

## 2018-09-17 ENCOUNTER — TELEPHONE (OUTPATIENT)
Dept: INTERNAL MEDICINE | Facility: CLINIC | Age: 83
End: 2018-09-17

## 2018-09-17 DIAGNOSIS — N39.0 URINARY TRACT INFECTION WITHOUT HEMATURIA, SITE UNSPECIFIED: Primary | ICD-10-CM

## 2018-09-17 DIAGNOSIS — N39.0 CHRONIC UTI: ICD-10-CM

## 2018-09-17 LAB — BACTERIA UR CULT: NORMAL

## 2018-09-17 RX ORDER — CIPROFLOXACIN 500 MG/1
500 TABLET ORAL EVERY 12 HOURS
Qty: 20 TABLET | Refills: 0 | Status: SHIPPED | OUTPATIENT
Start: 2018-09-17 | End: 2019-01-05

## 2018-09-17 NOTE — TELEPHONE ENCOUNTER
She has infection with same organism and will try another antibiotic, but it would be advisable for her to see Hernandes if this is persistent

## 2018-10-22 ENCOUNTER — OFFICE VISIT (OUTPATIENT)
Dept: UROLOGY | Facility: CLINIC | Age: 83
End: 2018-10-22
Payer: MEDICARE

## 2018-10-22 VITALS
HEART RATE: 66 BPM | DIASTOLIC BLOOD PRESSURE: 61 MMHG | BODY MASS INDEX: 22.35 KG/M2 | SYSTOLIC BLOOD PRESSURE: 129 MMHG | WEIGHT: 126.13 LBS | HEIGHT: 63 IN

## 2018-10-22 DIAGNOSIS — N39.0 RECURRENT UTI: ICD-10-CM

## 2018-10-22 DIAGNOSIS — N30.00 ACUTE CYSTITIS WITHOUT HEMATURIA: ICD-10-CM

## 2018-10-22 PROCEDURE — 1101F PT FALLS ASSESS-DOCD LE1/YR: CPT | Mod: CPTII,,, | Performed by: UROLOGY

## 2018-10-22 PROCEDURE — 87086 URINE CULTURE/COLONY COUNT: CPT

## 2018-10-22 PROCEDURE — 87088 URINE BACTERIA CULTURE: CPT

## 2018-10-22 PROCEDURE — 99204 OFFICE O/P NEW MOD 45 MIN: CPT | Mod: S$PBB,,, | Performed by: UROLOGY

## 2018-10-22 PROCEDURE — 99999 PR PBB SHADOW E&M-EST. PATIENT-LVL IV: CPT | Mod: PBBFAC,,, | Performed by: UROLOGY

## 2018-10-22 PROCEDURE — 99214 OFFICE O/P EST MOD 30 MIN: CPT | Mod: PBBFAC,PO | Performed by: UROLOGY

## 2018-10-22 PROCEDURE — 87186 SC STD MICRODIL/AGAR DIL: CPT

## 2018-10-22 PROCEDURE — 87077 CULTURE AEROBIC IDENTIFY: CPT

## 2018-10-22 RX ORDER — CIPROFLOXACIN 250 MG/1
500 TABLET, FILM COATED ORAL ONCE
Status: CANCELLED | OUTPATIENT
Start: 2018-10-22 | End: 2018-10-22

## 2018-10-22 RX ORDER — LIDOCAINE HYDROCHLORIDE 20 MG/ML
JELLY TOPICAL ONCE
Status: CANCELLED | OUTPATIENT
Start: 2018-10-22 | End: 2018-10-22

## 2018-10-22 NOTE — LETTER
October 22, 2018      Kenneth Panda MD  [address]  [place] [address]           [place] [facility]  [address]  [place] [address]  Phone: [phone]          Patient: Sachin Birch   MR Number: [id number]   YOB: 1926   Date of Visit: 10/22/2018       Dear Dr. Kenneth Panda:    Thank you for referring Sachin Birch to me for evaluation. Attached you will find relevant portions of my assessment and plan of care.    If you have questions, please do not hesitate to call me. I look forward to following Sachin Birch along with you.    Sincerely,    Willy Hernandes MD    Enclosure  CC:  No Recipients    If you would like to receive this communication electronically, please contact [email or web address] or [phone] to request more information on [employer] access.    For providers and/or their staff who would like to refer a patient to [facility], please contact us through our one-stop-shop provider referral line, [facility], at [phone].    If you feel you have received this communication in error or would no longer like to receive these types of communications, please e-mail [email or web address]

## 2018-10-22 NOTE — PROGRESS NOTES
CC: recurrent UTI    Sachin Birch is a 89 y.o. woman who is here for the evaluation of Urinary Tract Infection    A new pt referred by her PCP, Dr. Kenneth Panda.  Hx of recurrent UTI.  Had Klebsiella UTI as well as E. Coli UTI x 2 on routine exam.  C/o no symptoms related to her recent UTI.  She was treated with multiple abx course.  Pt reports no change in her overall urinary symptoms since she did not have any voiding problems.    However, she complains of urinary incontinence.  Uses 2 pads a day wearing Depends. She is not bothered by her incontinence (HESHAM).      Past Medical History:   Diagnosis Date    Atrial fibrillation     Depression     GI bleed     HTN (hypertension)     Hyperlipidemia      Past Surgical History:   Procedure Laterality Date    COLONOSCOPY N/A 2015    Performed by Jackson Anderson MD at [facility] (2ND FLR)    ECHOCARDIOGRAM-TRANSESOPHAGEAL N/A 2013    Performed by Tarsha Surgeon at [facility] TARSHA    EGD (ESOPHAGOGASTRODUODENOSCOPY) N/A 2013    Performed by Kenneth Payne MD at [facility] (4TH FLR)    EGD (ESOPHAGOGASTRODUODENOSCOPY) Left 2013    Performed by Kenneth Payne MD at [facility] (2ND FLR)    EGD (ESOPHAGOGASTRODUODENOSCOPY) Left 2013    Performed by Kenneth Payne MD at [facility] (2ND FLR)    ESOPHAGOGASTRODUODENOSCOPY (EGD) N/A 2015    Performed by Jackson Anderson MD at [facility] (2ND FLR)    HYSTERECTOMY      WRIST SURGERY       Social History     Tobacco Use    Smoking status: Never Smoker    Smokeless tobacco: Never Used   Substance Use Topics    Alcohol use: No     Alcohol/week: 0.0 oz    Drug use: No     Family History   Problem Relation Age of Onset    Diabetes Mother     No Known Problems Father     Hypertension Sister     Hyperlipidemia Sister     Diabetes Brother     Hypertension Brother     Hyperlipidemia Brother     Hyperlipidemia Daughter     Hypertension Son      Allergy:  Review of patient's allergies  indicates:  No Known Allergies  Outpatient Encounter Medications as of 10/22/2018   Medication Sig Dispense Refill    amLODIPine (NORVASC) 10 MG tablet TAKE 1 TABLET ONE TIME DAILY 90 tablet 3    atorvastatin (LIPITOR) 40 MG tablet TAKE 1 TABLET EVERY DAY 90 tablet 3    benazepril (LOTENSIN) 40 MG tablet Take 1 tablet (40 mg total) by mouth every morning. 90 tablet 3    CALCIUM CARBONATE/VITAMIN D3 (CALCIUM 500 + D, D3, ORAL) Take 1 tablet by mouth Daily.       carvedilol (COREG) 25 MG tablet Take 1 tablet (25 mg total) by mouth 2 (two) times daily with meals. 180 tablet 3    metFORMIN (GLUCOPHAGE) 500 MG tablet Take 2 tablets (1,000 mg total) by mouth daily with breakfast. 180 tablet 3    pantoprazole (PROTONIX) 40 MG tablet TAKE 1 TABLET EVERY DAY 90 tablet 3    aspirin 81 MG Chew Take 1 tablet (81 mg total) by mouth once daily. 90 tablet 3    cephALEXin (KEFLEX) 500 MG capsule       ciprofloxacin HCl (CIPRO) 500 MG tablet Take 1 tablet (500 mg total) by mouth every 12 (twelve) hours. 20 tablet 0    ciprofloxacin-dexamethasone 0.3-0.1% (CIPRODEX) 0.3-0.1 % DrpS 4 drops to right ear BID for infection 7.5 mL 2     No facility-administered encounter medications on file as of 10/22/2018.      Review of Systems   ROS  Physical Exam     Vitals:    10/22/18 1009   BP: 129/61   Pulse: 66     Physical Exam   Constitutional: She is oriented to person, place, and time. She appears well-developed and well-nourished. No distress.   HENT:   Head: Normocephalic and atraumatic.   Right Ear: External ear normal.   Left Ear: External ear normal.   Nose: Nose normal.   Eyes: Conjunctivae and EOM are normal. Pupils are equal, round, and reactive to light. No scleral icterus.   Neck: Normal range of motion. Neck supple. No JVD present. No tracheal deviation present. No thyromegaly present.   Cardiovascular: Normal rate, regular rhythm, normal heart sounds and intact distal pulses.  Exam reveals no gallop and no friction  rub.    No murmur heard.  Pulmonary/Chest: Effort normal and breath sounds normal. No respiratory distress. She has no wheezes.   Abdominal: Soft. Bowel sounds are normal. She exhibits no distension and no mass. There is no tenderness. There is no rebound and no guarding.   Genitourinary: No vaginal discharge found.   Musculoskeletal: Normal range of motion. She exhibits no edema, tenderness or deformity.   Lymphadenopathy:     She has no cervical adenopathy.   Neurological: She is alert and oriented to person, place, and time.   Skin: Skin is warm and dry. She is not diaphoretic.     Psychiatric: She has a normal mood and affect. Her behavior is normal. Thought content normal.         LABS:  Lab Results   Component Value Date    CREATININE 1.0 06/19/2018    CREATININE 1.0 05/22/2018    CREATININE 0.8 11/20/2017     Urine Culture, Routine   Date Value Ref Range Status   09/14/2018 ESCHERICHIA COLI  >100,000 cfu/ml    Final     UA clear    PVR per bladder scan: 0 ml    Assessment and Plan:  Sachin was seen today for urinary tract infection.    Diagnoses and all orders for this visit:    Acute cystitis without hematuria  -     US Retroperitoneal Complete (Kidney and; Future  -     Cystoscopy; Future    Recurrent UTI  -     Urine culture    Other orders  -     lidocaine HCl 2% urojet  -     ciprofloxacin HCl tablet 500 mg    after discussing her condition with pt and her daughter, we decided not to proceed with cysto.  She was asymptomatic before with her UTI.  From now on, unless she has symptoms, we won't treat her positive urine culture.  Will do a baseline US.  Recommend to try Align probiotics, cranberry pills.  A good control of DM recommended.    Follow-up:  Follow-up if symptoms worsen or fail to improve.

## 2018-10-23 ENCOUNTER — OFFICE VISIT (OUTPATIENT)
Dept: OTOLARYNGOLOGY | Facility: CLINIC | Age: 83
End: 2018-10-23
Payer: MEDICARE

## 2018-10-23 ENCOUNTER — HOSPITAL ENCOUNTER (OUTPATIENT)
Dept: RADIOLOGY | Facility: HOSPITAL | Age: 83
Discharge: HOME | End: 2018-10-23
Attending: UROLOGY
Payer: MEDICARE

## 2018-10-23 VITALS
DIASTOLIC BLOOD PRESSURE: 64 MMHG | BODY MASS INDEX: 22.18 KG/M2 | HEART RATE: 67 BPM | SYSTOLIC BLOOD PRESSURE: 125 MMHG | WEIGHT: 125.25 LBS

## 2018-10-23 DIAGNOSIS — H72.91 TYMPANIC MEMBRANE PERFORATION, RIGHT: Primary | ICD-10-CM

## 2018-10-23 DIAGNOSIS — H60.41 CHOLESTEATOMA OF EXTERNAL AUDITORY CANAL, RIGHT: ICD-10-CM

## 2018-10-23 DIAGNOSIS — N30.00 ACUTE CYSTITIS WITHOUT HEMATURIA: ICD-10-CM

## 2018-10-23 DIAGNOSIS — H90.A22 SENSORINEURAL HEARING LOSS (SNHL) OF LEFT EAR WITH RESTRICTED HEARING OF RIGHT EAR: ICD-10-CM

## 2018-10-23 DIAGNOSIS — H91.91 DEAFNESS IN RIGHT EAR: ICD-10-CM

## 2018-10-23 DIAGNOSIS — H61.22 IMPACTED CERUMEN OF LEFT EAR: ICD-10-CM

## 2018-10-23 PROCEDURE — 76770 US EXAM ABDO BACK WALL COMP: CPT | Mod: TC

## 2018-10-23 PROCEDURE — 69210 REMOVE IMPACTED EAR WAX UNI: CPT | Mod: S$PBB,,, | Performed by: OTOLARYNGOLOGY

## 2018-10-23 PROCEDURE — 99499 UNLISTED E&M SERVICE: CPT | Mod: S$PBB,,, | Performed by: OTOLARYNGOLOGY

## 2018-10-23 PROCEDURE — 69210 REMOVE IMPACTED EAR WAX UNI: CPT | Mod: 50,PBBFAC | Performed by: OTOLARYNGOLOGY

## 2018-10-23 PROCEDURE — 99214 OFFICE O/P EST MOD 30 MIN: CPT | Mod: PBBFAC,25 | Performed by: OTOLARYNGOLOGY

## 2018-10-23 PROCEDURE — 99999 PR PBB SHADOW E&M-EST. PATIENT-LVL IV: CPT | Mod: PBBFAC,,, | Performed by: OTOLARYNGOLOGY

## 2018-10-23 PROCEDURE — 76770 US EXAM ABDO BACK WALL COMP: CPT | Mod: 26,,, | Performed by: RADIOLOGY

## 2018-10-23 NOTE — PATIENT INSTRUCTIONS
Cerumen/keratin removed from AD eac floor cavity  Cerumen/crust removed from AS eac,outer 1/3  RTC 3 months for ear cleaning  Previous audiogram reviewed; pt. remains a candidate for amplification for the left ear if interested

## 2018-10-23 NOTE — PROGRESS NOTES
Subjective:       Patient ID: Sachin Birch is a 89 y.o. female.    Chief Complaint: Perforation of right tympanic membrane and Cerumen Impaction    HPI: Ms. Birch is a 89-year-old oriented female who is accompanied by her daughter today.  She was instructed to return for ear canal inspection and cleaning every 3 months.She was initially evaluated by me 07/23/2018.    She was diagnosed with a right central tympanic membrane perforation as well as deafness in her right ear per audiometric study . She has never worn a hearing aid in her left ear, her only  hearing ear.  There was evidence of a  left ear 30 decibel SRT score and significant sensorineural hearing loss  with normal tympanometry on the left side.  The study is reproduced below for reference      Past Medical History:   Diagnosis Date    Atrial fibrillation     Depression     GI bleed     HTN (hypertension)     Hyperlipidemia      Current Outpatient Medications on File Prior to Visit   Medication Sig Dispense Refill    amLODIPine (NORVASC) 10 MG tablet TAKE 1 TABLET ONE TIME DAILY 90 tablet 3    atorvastatin (LIPITOR) 40 MG tablet TAKE 1 TABLET EVERY DAY 90 tablet 3    benazepril (LOTENSIN) 40 MG tablet Take 1 tablet (40 mg total) by mouth every morning. 90 tablet 3    CALCIUM CARBONATE/VITAMIN D3 (CALCIUM 500 + D, D3, ORAL) Take 1 tablet by mouth Daily.       carvedilol (COREG) 25 MG tablet Take 1 tablet (25 mg total) by mouth 2 (two) times daily with meals. 180 tablet 3    metFORMIN (GLUCOPHAGE) 500 MG tablet Take 2 tablets (1,000 mg total) by mouth daily with breakfast. 180 tablet 3    pantoprazole (PROTONIX) 40 MG tablet TAKE 1 TABLET EVERY DAY 90 tablet 3    aspirin 81 MG Chew Take 1 tablet (81 mg total) by mouth once daily. 90 tablet 3    cephALEXin (KEFLEX) 500 MG capsule       ciprofloxacin HCl (CIPRO) 500 MG tablet Take 1 tablet (500 mg total) by mouth every 12 (twelve) hours. 20 tablet 0    ciprofloxacin-dexamethasone 0.3-0.1%  (CIPRODEX) 0.3-0.1 % DrpS 4 drops to right ear BID for infection 7.5 mL 2     No current facility-administered medications on file prior to visit.          Review of Systems        Objective:     Blood pressure 125/60 4 pulse 67 height 5 ft 3 in weight 125  General:  Alert and oriented lady in no acute distress  Both ears were examined under the microscope in the micro procedure room  Procedure:  Cerumen and keratin debris removed from the right ear canal floor cavity with suction and micro forceps.  A crust of cerumen is removed from the floor of the left ear canal.  Physical Exam   HENT:   Ears:        Assessment:       1. Tympanic membrane perforation, right    2. Cholesteatoma of external auditory canal, right    3. Impacted cerumen of left ear    4. Deafness in right ear    5. Sensorineural hearing loss (SNHL) of left ear with restricted hearing of right ear        Plan:     Cerumen/keratin removed from AD eac floor cavity  Cerumen/crust removed from AS eac,outer 1/3  RTC 3 months for ear cleaning  Previous audiogram reviewed; pt. remains a candidate for amplification for the left ear if interested

## 2018-10-25 ENCOUNTER — TELEPHONE (OUTPATIENT)
Dept: UROLOGY | Facility: CLINIC | Age: 83
End: 2018-10-25

## 2018-10-25 LAB — BACTERIA UR CULT: NORMAL

## 2018-10-25 NOTE — TELEPHONE ENCOUNTER
UTI noted on her voided urine.  Since she is asymptomatic, I won't treat her.  If she is symptomatic, will do cath urine for culture.  I left a message to her daughter.

## 2018-12-31 ENCOUNTER — TELEPHONE (OUTPATIENT)
Dept: INTERNAL MEDICINE | Facility: CLINIC | Age: 83
End: 2018-12-31

## 2018-12-31 NOTE — TELEPHONE ENCOUNTER
----- Message from Corina Rodriguez sent at 12/31/2018  8:23 AM CST -----  Contact: More/ Daughter/[phone]  Patient's daughter would like for Dr to know that on Saturday, the patient had an episode of bleeding. The daughter thinks it was rectal bleeding but is not sure, the patient has not had any bleeding since. Daughter would like what should be done next. Please advise.

## 2018-12-31 NOTE — TELEPHONE ENCOUNTER
They were shopping  On Saturday and daughter saw a little bit of blood in mother's diaper.  She has been watching closely since and no more blood seen, not on tissue either.  Her bp is 110/54 and she is feeling fine. She also has good appetite.   She wants to leave tomorrow to visit grandchildren in [place] for a few days..    I told daughter to continue watch and be sure diet is helping stool to be soft to avoid irritation of rectal area and  We made appt to see dr yost on Monday for eval.     Date/time read back.  She expressed understanding.

## 2019-01-01 ENCOUNTER — OFFICE VISIT (OUTPATIENT)
Dept: INTERNAL MEDICINE | Facility: CLINIC | Age: 84
End: 2019-01-01
Payer: MEDICARE

## 2019-01-01 ENCOUNTER — HOSPITAL ENCOUNTER (OUTPATIENT)
Dept: RADIOLOGY | Facility: HOSPITAL | Age: 84
Discharge: HOME | End: 2019-08-01
Attending: INTERNAL MEDICINE
Payer: MEDICARE

## 2019-01-01 ENCOUNTER — TELEPHONE (OUTPATIENT)
Dept: INTERNAL MEDICINE | Facility: CLINIC | Age: 84
End: 2019-01-01

## 2019-01-01 ENCOUNTER — LAB VISIT (OUTPATIENT)
Dept: LAB | Facility: HOSPITAL | Age: 84
End: 2019-01-01
Attending: INTERNAL MEDICINE
Payer: MEDICARE

## 2019-01-01 ENCOUNTER — OFFICE VISIT (OUTPATIENT)
Dept: CARDIOLOGY | Facility: CLINIC | Age: 84
End: 2019-01-01
Payer: MEDICARE

## 2019-01-01 VITALS
DIASTOLIC BLOOD PRESSURE: 77 MMHG | HEIGHT: 61 IN | HEART RATE: 62 BPM | SYSTOLIC BLOOD PRESSURE: 179 MMHG | BODY MASS INDEX: 22.1 KG/M2 | WEIGHT: 117.06 LBS

## 2019-01-01 VITALS
SYSTOLIC BLOOD PRESSURE: 170 MMHG | HEIGHT: 61 IN | RESPIRATION RATE: 16 BRPM | BODY MASS INDEX: 21.14 KG/M2 | HEART RATE: 68 BPM | TEMPERATURE: 98 F | DIASTOLIC BLOOD PRESSURE: 60 MMHG | WEIGHT: 112 LBS

## 2019-01-01 VITALS
BODY MASS INDEX: 21.27 KG/M2 | TEMPERATURE: 98 F | SYSTOLIC BLOOD PRESSURE: 118 MMHG | RESPIRATION RATE: 18 BRPM | HEIGHT: 61 IN | DIASTOLIC BLOOD PRESSURE: 60 MMHG | HEART RATE: 64 BPM | WEIGHT: 112.63 LBS

## 2019-01-01 DIAGNOSIS — E11.69 TYPE 2 DIABETES MELLITUS WITH HYPERCHOLESTEROLEMIA: ICD-10-CM

## 2019-01-01 DIAGNOSIS — M75.51 BURSITIS OF RIGHT SHOULDER: Primary | ICD-10-CM

## 2019-01-01 DIAGNOSIS — E78.00 TYPE 2 DIABETES MELLITUS WITH HYPERCHOLESTEROLEMIA: ICD-10-CM

## 2019-01-01 DIAGNOSIS — I35.1 NONRHEUMATIC AORTIC VALVE INSUFFICIENCY: ICD-10-CM

## 2019-01-01 DIAGNOSIS — I25.10 CORONARY ARTERY DISEASE INVOLVING NATIVE CORONARY ARTERY OF NATIVE HEART WITHOUT ANGINA PECTORIS: Primary | ICD-10-CM

## 2019-01-01 DIAGNOSIS — I10 ESSENTIAL HYPERTENSION: ICD-10-CM

## 2019-01-01 DIAGNOSIS — K27.9 PEPTIC ULCER DISEASE: ICD-10-CM

## 2019-01-01 DIAGNOSIS — E78.5 HYPERLIPIDEMIA ASSOCIATED WITH TYPE 2 DIABETES MELLITUS: ICD-10-CM

## 2019-01-01 DIAGNOSIS — I42.0 DILATED CARDIOMYOPATHY: ICD-10-CM

## 2019-01-01 DIAGNOSIS — D50.0 IRON DEFICIENCY ANEMIA DUE TO CHRONIC BLOOD LOSS: ICD-10-CM

## 2019-01-01 DIAGNOSIS — K62.5 RECTAL BLEEDING: ICD-10-CM

## 2019-01-01 DIAGNOSIS — E11.22 TYPE 2 DIABETES MELLITUS WITH STAGE 2 CHRONIC KIDNEY DISEASE AND HYPERTENSION: ICD-10-CM

## 2019-01-01 DIAGNOSIS — K27.9 PUD (PEPTIC ULCER DISEASE): ICD-10-CM

## 2019-01-01 DIAGNOSIS — E11.9 DIABETES MELLITUS WITHOUT COMPLICATION: ICD-10-CM

## 2019-01-01 DIAGNOSIS — I12.9 TYPE 2 DIABETES MELLITUS WITH STAGE 2 CHRONIC KIDNEY DISEASE AND HYPERTENSION: ICD-10-CM

## 2019-01-01 DIAGNOSIS — R82.90 ABNORMAL URINALYSIS: Primary | ICD-10-CM

## 2019-01-01 DIAGNOSIS — M75.51 BURSITIS OF RIGHT SHOULDER: ICD-10-CM

## 2019-01-01 DIAGNOSIS — N18.2 TYPE 2 DIABETES MELLITUS WITH STAGE 2 CHRONIC KIDNEY DISEASE AND HYPERTENSION: ICD-10-CM

## 2019-01-01 DIAGNOSIS — E11.69 HYPERLIPIDEMIA ASSOCIATED WITH TYPE 2 DIABETES MELLITUS: ICD-10-CM

## 2019-01-01 DIAGNOSIS — Z00.00 ANNUAL PHYSICAL EXAM: Primary | ICD-10-CM

## 2019-01-01 DIAGNOSIS — D62 ACUTE BLOOD LOSS ANEMIA: ICD-10-CM

## 2019-01-01 DIAGNOSIS — K57.31 DIVERTICULOSIS OF LARGE INTESTINE WITH HEMORRHAGE: ICD-10-CM

## 2019-01-01 DIAGNOSIS — I48.92 ATRIAL FLUTTER, UNSPECIFIED TYPE: ICD-10-CM

## 2019-01-01 DIAGNOSIS — N30.00 ACUTE CYSTITIS WITHOUT HEMATURIA: ICD-10-CM

## 2019-01-01 DIAGNOSIS — I48.20 CHRONIC ATRIAL FIBRILLATION: ICD-10-CM

## 2019-01-01 DIAGNOSIS — N39.0 RECURRENT UTI: ICD-10-CM

## 2019-01-01 DIAGNOSIS — I70.0 ATHEROSCLEROSIS OF AORTA: ICD-10-CM

## 2019-01-01 LAB
ALBUMIN SERPL BCP-MCNC: 3.6 G/DL (ref 3.5–5.2)
ALP SERPL-CCNC: 61 U/L (ref 55–135)
ALT SERPL W/O P-5'-P-CCNC: 11 U/L (ref 10–44)
ANION GAP SERPL CALC-SCNC: 8 MMOL/L (ref 8–16)
AST SERPL-CCNC: 14 U/L (ref 10–40)
BASOPHILS # BLD AUTO: 0.06 K/UL (ref 0–0.2)
BASOPHILS NFR BLD: 1.1 % (ref 0–1.9)
BILIRUB SERPL-MCNC: 0.5 MG/DL (ref 0.1–1)
BUN SERPL-MCNC: 21 MG/DL (ref 10–30)
CALCIUM SERPL-MCNC: 9.4 MG/DL (ref 8.7–10.5)
CHLORIDE SERPL-SCNC: 102 MMOL/L (ref 95–110)
CHOLEST SERPL-MCNC: 145 MG/DL (ref 120–199)
CHOLEST/HDLC SERPL: 2.5 {RATIO} (ref 2–5)
CO2 SERPL-SCNC: 29 MMOL/L (ref 23–29)
CREAT SERPL-MCNC: 0.9 MG/DL (ref 0.5–1.4)
DIFFERENTIAL METHOD: ABNORMAL
EOSINOPHIL # BLD AUTO: 0.2 K/UL (ref 0–0.5)
EOSINOPHIL NFR BLD: 3.8 % (ref 0–8)
ERYTHROCYTE [DISTWIDTH] IN BLOOD BY AUTOMATED COUNT: 11.8 % (ref 11.5–14.5)
EST. GFR  (AFRICAN AMERICAN): >60 ML/MIN/1.73 M^2
EST. GFR  (NON AFRICAN AMERICAN): 55.7 ML/MIN/1.73 M^2
ESTIMATED AVG GLUCOSE: 120 MG/DL (ref 68–131)
GLUCOSE SERPL-MCNC: 108 MG/DL (ref 70–110)
HBA1C MFR BLD HPLC: 5.8 % (ref 4–5.6)
HCT VFR BLD AUTO: 40.4 % (ref 37–48.5)
HDLC SERPL-MCNC: 59 MG/DL (ref 40–75)
HDLC SERPL: 40.7 % (ref 20–50)
HGB BLD-MCNC: 13 G/DL (ref 12–16)
IMM GRANULOCYTES # BLD AUTO: 0.01 K/UL (ref 0–0.04)
IMM GRANULOCYTES NFR BLD AUTO: 0.2 % (ref 0–0.5)
LDLC SERPL CALC-MCNC: 70.6 MG/DL (ref 63–159)
LYMPHOCYTES # BLD AUTO: 1.4 K/UL (ref 1–4.8)
LYMPHOCYTES NFR BLD: 26.1 % (ref 18–48)
MCH RBC QN AUTO: 31.9 PG (ref 27–31)
MCHC RBC AUTO-ENTMCNC: 32.2 G/DL (ref 32–36)
MCV RBC AUTO: 99 FL (ref 82–98)
MONOCYTES # BLD AUTO: 0.5 K/UL (ref 0.3–1)
MONOCYTES NFR BLD: 9.5 % (ref 4–15)
NEUTROPHILS # BLD AUTO: 3.2 K/UL (ref 1.8–7.7)
NEUTROPHILS NFR BLD: 59.3 % (ref 38–73)
NONHDLC SERPL-MCNC: 86 MG/DL
NRBC BLD-RTO: 0 /100 WBC
PLATELET # BLD AUTO: 174 K/UL (ref 150–350)
PMV BLD AUTO: 10.7 FL (ref 9.2–12.9)
POTASSIUM SERPL-SCNC: 3.8 MMOL/L (ref 3.5–5.1)
PROT SERPL-MCNC: 6.8 G/DL (ref 6–8.4)
RBC # BLD AUTO: 4.07 M/UL (ref 4–5.4)
SODIUM SERPL-SCNC: 139 MMOL/L (ref 136–145)
T4 FREE SERPL-MCNC: 1.16 NG/DL (ref 0.71–1.51)
TRIGL SERPL-MCNC: 77 MG/DL (ref 30–150)
TSH SERPL DL<=0.005 MIU/L-ACNC: 0.65 UIU/ML (ref 0.4–4)
WBC # BLD AUTO: 5.47 K/UL (ref 3.9–12.7)

## 2019-01-01 PROCEDURE — 1101F PR PT FALLS ASSESS DOC 0-1 FALLS W/OUT INJ PAST YR: ICD-10-PCS | Mod: CPTII,S$GLB,, | Performed by: INTERNAL MEDICINE

## 2019-01-01 PROCEDURE — 1101F PT FALLS ASSESS-DOCD LE1/YR: CPT | Mod: CPTII,S$GLB,, | Performed by: INTERNAL MEDICINE

## 2019-01-01 PROCEDURE — 1101F PR PT FALLS ASSESS DOC 0-1 FALLS W/OUT INJ PAST YR: ICD-10-PCS | Mod: HCNC,CPTII,S$GLB, | Performed by: INTERNAL MEDICINE

## 2019-01-01 PROCEDURE — 73030 X-RAY EXAM OF SHOULDER: CPT | Mod: 26,HCNC,RT, | Performed by: RADIOLOGY

## 2019-01-01 PROCEDURE — 84439 ASSAY OF FREE THYROXINE: CPT | Mod: HCNC

## 2019-01-01 PROCEDURE — 99397 PR PREVENTIVE VISIT,EST,65 & OVER: ICD-10-PCS | Mod: HCNC,S$GLB,, | Performed by: INTERNAL MEDICINE

## 2019-01-01 PROCEDURE — 99999 PR PBB SHADOW E&M-EST. PATIENT-LVL III: ICD-10-PCS | Mod: PBBFAC,HCNC,, | Performed by: INTERNAL MEDICINE

## 2019-01-01 PROCEDURE — 99214 OFFICE O/P EST MOD 30 MIN: CPT | Mod: 25,HCNC,S$GLB, | Performed by: INTERNAL MEDICINE

## 2019-01-01 PROCEDURE — 99999 PR PBB SHADOW E&M-EST. PATIENT-LVL III: CPT | Mod: PBBFAC,HCNC,, | Performed by: INTERNAL MEDICINE

## 2019-01-01 PROCEDURE — 99499 RISK ADDL DX/OHS AUDIT: ICD-10-PCS | Mod: S$GLB,,, | Performed by: INTERNAL MEDICINE

## 2019-01-01 PROCEDURE — 85025 COMPLETE CBC W/AUTO DIFF WBC: CPT | Mod: HCNC

## 2019-01-01 PROCEDURE — 83036 HEMOGLOBIN GLYCOSYLATED A1C: CPT | Mod: HCNC

## 2019-01-01 PROCEDURE — 99214 PR OFFICE/OUTPT VISIT, EST, LEVL IV, 30-39 MIN: ICD-10-PCS | Mod: 25,HCNC,S$GLB, | Performed by: INTERNAL MEDICINE

## 2019-01-01 PROCEDURE — 99397 PER PM REEVAL EST PAT 65+ YR: CPT | Mod: HCNC,S$GLB,, | Performed by: INTERNAL MEDICINE

## 2019-01-01 PROCEDURE — 36415 COLL VENOUS BLD VENIPUNCTURE: CPT | Mod: HCNC,PO

## 2019-01-01 PROCEDURE — 96372 PR INJECTION,THERAP/PROPH/DIAG2ST, IM OR SUBCUT: ICD-10-PCS | Mod: HCNC,59,S$GLB, | Performed by: INTERNAL MEDICINE

## 2019-01-01 PROCEDURE — 1101F PT FALLS ASSESS-DOCD LE1/YR: CPT | Mod: HCNC,CPTII,S$GLB, | Performed by: INTERNAL MEDICINE

## 2019-01-01 PROCEDURE — 80061 LIPID PANEL: CPT | Mod: HCNC

## 2019-01-01 PROCEDURE — 99214 PR OFFICE/OUTPT VISIT, EST, LEVL IV, 30-39 MIN: ICD-10-PCS | Mod: S$GLB,,, | Performed by: INTERNAL MEDICINE

## 2019-01-01 PROCEDURE — 99214 OFFICE O/P EST MOD 30 MIN: CPT | Mod: S$GLB,,, | Performed by: INTERNAL MEDICINE

## 2019-01-01 PROCEDURE — 99499 UNLISTED E&M SERVICE: CPT | Mod: S$GLB,,, | Performed by: INTERNAL MEDICINE

## 2019-01-01 PROCEDURE — 20610 DRAIN/INJ JOINT/BURSA W/O US: CPT | Mod: HCNC,RT,S$GLB, | Performed by: INTERNAL MEDICINE

## 2019-01-01 PROCEDURE — 80053 COMPREHEN METABOLIC PANEL: CPT | Mod: HCNC

## 2019-01-01 PROCEDURE — 20610 PR DRAIN/INJECT LARGE JOINT/BURSA: ICD-10-PCS | Mod: HCNC,RT,S$GLB, | Performed by: INTERNAL MEDICINE

## 2019-01-01 PROCEDURE — 73030 X-RAY EXAM OF SHOULDER: CPT | Mod: TC,HCNC,PO,RT

## 2019-01-01 PROCEDURE — 96372 THER/PROPH/DIAG INJ SC/IM: CPT | Mod: HCNC,59,S$GLB, | Performed by: INTERNAL MEDICINE

## 2019-01-01 PROCEDURE — 73030 XR SHOULDER COMPLETE 2 OR MORE VIEWS RIGHT: ICD-10-PCS | Mod: 26,HCNC,RT, | Performed by: RADIOLOGY

## 2019-01-01 PROCEDURE — 84443 ASSAY THYROID STIM HORMONE: CPT | Mod: HCNC

## 2019-01-01 RX ORDER — ATORVASTATIN CALCIUM 40 MG/1
40 TABLET, FILM COATED ORAL DAILY
Qty: 90 TABLET | Refills: 3 | Status: SHIPPED | OUTPATIENT
Start: 2019-01-01

## 2019-01-01 RX ORDER — PANTOPRAZOLE SODIUM 40 MG/1
40 TABLET, DELAYED RELEASE ORAL DAILY
Qty: 90 TABLET | Refills: 3 | Status: SHIPPED | OUTPATIENT
Start: 2019-01-01 | End: 2019-01-01 | Stop reason: SDUPTHER

## 2019-01-01 RX ORDER — TRIAMCINOLONE ACETONIDE 40 MG/ML
40 INJECTION, SUSPENSION INTRA-ARTICULAR; INTRAMUSCULAR ONCE
Status: COMPLETED | OUTPATIENT
Start: 2019-01-01 | End: 2019-01-01

## 2019-01-01 RX ORDER — AMLODIPINE BESYLATE 10 MG/1
TABLET ORAL
Qty: 90 TABLET | Refills: 3 | Status: SHIPPED | OUTPATIENT
Start: 2019-01-01 | End: 2019-01-01 | Stop reason: SDUPTHER

## 2019-01-01 RX ORDER — PANTOPRAZOLE SODIUM 40 MG/1
40 TABLET, DELAYED RELEASE ORAL DAILY
Qty: 90 TABLET | Refills: 3 | Status: SHIPPED | OUTPATIENT
Start: 2019-01-01

## 2019-01-01 RX ORDER — ATORVASTATIN CALCIUM 40 MG/1
40 TABLET, FILM COATED ORAL DAILY
Qty: 90 TABLET | Refills: 3 | Status: SHIPPED | OUTPATIENT
Start: 2019-01-01 | End: 2019-01-01 | Stop reason: SDUPTHER

## 2019-01-01 RX ORDER — METFORMIN HYDROCHLORIDE 500 MG/1
500 TABLET ORAL
Qty: 90 TABLET | Refills: 3 | Status: SHIPPED | OUTPATIENT
Start: 2019-01-01 | End: 2020-07-15

## 2019-01-01 RX ORDER — DICLOFENAC SODIUM 10 MG/G
2 GEL TOPICAL DAILY
Qty: 100 G | Refills: 1 | Status: SHIPPED | OUTPATIENT
Start: 2019-01-01

## 2019-01-01 RX ORDER — AMLODIPINE BESYLATE 10 MG/1
TABLET ORAL
Qty: 90 TABLET | Refills: 3 | Status: SHIPPED | OUTPATIENT
Start: 2019-01-01

## 2019-01-01 RX ORDER — CARVEDILOL 25 MG/1
25 TABLET ORAL 2 TIMES DAILY WITH MEALS
Qty: 180 TABLET | Refills: 3 | Status: SHIPPED | OUTPATIENT
Start: 2019-01-01

## 2019-01-01 RX ORDER — CARVEDILOL 25 MG/1
25 TABLET ORAL 2 TIMES DAILY WITH MEALS
Qty: 180 TABLET | Refills: 3 | Status: SHIPPED | OUTPATIENT
Start: 2019-01-01 | End: 2019-01-01 | Stop reason: SDUPTHER

## 2019-01-01 RX ADMIN — TRIAMCINOLONE ACETONIDE 40 MG: 40 INJECTION, SUSPENSION INTRA-ARTICULAR; INTRAMUSCULAR at 07:08

## 2019-01-05 ENCOUNTER — HOSPITAL ENCOUNTER (OUTPATIENT)
Facility: HOSPITAL | Age: 84
Discharge: HOME | End: 2019-01-08
Attending: EMERGENCY MEDICINE | Admitting: EMERGENCY MEDICINE
Payer: MEDICARE

## 2019-01-05 DIAGNOSIS — R09.02 HYPOXIA: ICD-10-CM

## 2019-01-05 DIAGNOSIS — K92.2 GASTROINTESTINAL HEMORRHAGE, UNSPECIFIED GASTROINTESTINAL HEMORRHAGE TYPE: ICD-10-CM

## 2019-01-05 DIAGNOSIS — K92.2 GASTROINTESTINAL HEMORRHAGE: Primary | ICD-10-CM

## 2019-01-05 DIAGNOSIS — I10 ESSENTIAL HYPERTENSION: ICD-10-CM

## 2019-01-05 DIAGNOSIS — I12.9 TYPE 2 DIABETES MELLITUS WITH STAGE 2 CHRONIC KIDNEY DISEASE AND HYPERTENSION: ICD-10-CM

## 2019-01-05 DIAGNOSIS — D62 ACUTE BLOOD LOSS ANEMIA: ICD-10-CM

## 2019-01-05 DIAGNOSIS — E11.22 TYPE 2 DIABETES MELLITUS WITH STAGE 2 CHRONIC KIDNEY DISEASE AND HYPERTENSION: ICD-10-CM

## 2019-01-05 DIAGNOSIS — I42.9 CARDIOMYOPATHY, UNSPECIFIED TYPE: ICD-10-CM

## 2019-01-05 DIAGNOSIS — D64.9 ANEMIA, UNSPECIFIED TYPE: ICD-10-CM

## 2019-01-05 DIAGNOSIS — N18.2 TYPE 2 DIABETES MELLITUS WITH STAGE 2 CHRONIC KIDNEY DISEASE AND HYPERTENSION: ICD-10-CM

## 2019-01-05 DIAGNOSIS — D50.9 IRON DEFICIENCY ANEMIA, UNSPECIFIED IRON DEFICIENCY ANEMIA TYPE: ICD-10-CM

## 2019-01-05 DIAGNOSIS — E11.69 TYPE 2 DIABETES MELLITUS WITH HYPERCHOLESTEROLEMIA: ICD-10-CM

## 2019-01-05 DIAGNOSIS — N39.0 URINARY TRACT INFECTION WITHOUT HEMATURIA, SITE UNSPECIFIED: ICD-10-CM

## 2019-01-05 DIAGNOSIS — E78.00 TYPE 2 DIABETES MELLITUS WITH HYPERCHOLESTEROLEMIA: ICD-10-CM

## 2019-01-05 DIAGNOSIS — G47.34 SLEEP RELATED HYPOXIA: ICD-10-CM

## 2019-01-05 LAB
ABO + RH BLD: NORMAL
ALBUMIN SERPL BCP-MCNC: 3.6 G/DL
ALP SERPL-CCNC: 46 U/L
ALT SERPL W/O P-5'-P-CCNC: 9 U/L
ANION GAP SERPL CALC-SCNC: 8 MMOL/L
AST SERPL-CCNC: 21 U/L
BACTERIA #/AREA URNS AUTO: ABNORMAL /HPF
BASOPHILS # BLD AUTO: 0.04 K/UL
BASOPHILS NFR BLD: 0.5 %
BILIRUB SERPL-MCNC: 0.4 MG/DL
BILIRUB UR QL STRIP: NEGATIVE
BLD GP AB SCN CELLS X3 SERPL QL: NORMAL
BUN SERPL-MCNC: 30 MG/DL
CALCIUM SERPL-MCNC: 9.8 MG/DL
CHLORIDE SERPL-SCNC: 105 MMOL/L
CLARITY UR REFRACT.AUTO: ABNORMAL
CO2 SERPL-SCNC: 27 MMOL/L
COLOR UR AUTO: YELLOW
CREAT SERPL-MCNC: 1 MG/DL
DIFFERENTIAL METHOD: ABNORMAL
EOSINOPHIL # BLD AUTO: 0 K/UL
EOSINOPHIL NFR BLD: 0.1 %
ERYTHROCYTE [DISTWIDTH] IN BLOOD BY AUTOMATED COUNT: 13.2 %
EST. GFR  (AFRICAN AMERICAN): 56.5 ML/MIN/1.73 M^2
EST. GFR  (NON AFRICAN AMERICAN): 49 ML/MIN/1.73 M^2
ESTIMATED AVG GLUCOSE: 126 MG/DL
FERRITIN SERPL-MCNC: 12 NG/ML
GLUCOSE SERPL-MCNC: 148 MG/DL
GLUCOSE UR QL STRIP: NEGATIVE
HBA1C MFR BLD HPLC: 6 %
HCT VFR BLD AUTO: 26.6 %
HCT VFR BLD AUTO: 27.4 %
HGB BLD-MCNC: 8.4 G/DL
HGB BLD-MCNC: 8.9 G/DL
HGB UR QL STRIP: NEGATIVE
IMM GRANULOCYTES # BLD AUTO: 0.02 K/UL
IMM GRANULOCYTES NFR BLD AUTO: 0.2 %
INR PPP: 1.2
IRON SERPL-MCNC: 34 UG/DL
KETONES UR QL STRIP: NEGATIVE
LEUKOCYTE ESTERASE UR QL STRIP: NEGATIVE
LYMPHOCYTES # BLD AUTO: 1 K/UL
LYMPHOCYTES NFR BLD: 11.9 %
MCH RBC QN AUTO: 29.4 PG
MCHC RBC AUTO-ENTMCNC: 32.5 G/DL
MCV RBC AUTO: 90 FL
MICROSCOPIC COMMENT: ABNORMAL
MONOCYTES # BLD AUTO: 0.3 K/UL
MONOCYTES NFR BLD: 4.1 %
NEUTROPHILS # BLD AUTO: 6.9 K/UL
NEUTROPHILS NFR BLD: 83.2 %
NITRITE UR QL STRIP: POSITIVE
NRBC BLD-RTO: 0 /100 WBC
PH UR STRIP: 5 [PH] (ref 5–8)
PLATELET # BLD AUTO: 190 K/UL
PMV BLD AUTO: 10.6 FL
POCT GLUCOSE: 134 MG/DL (ref 70–110)
POTASSIUM SERPL-SCNC: 4.5 MMOL/L
PROT SERPL-MCNC: 7.2 G/DL
PROT UR QL STRIP: NEGATIVE
PROTHROMBIN TIME: 12.1 SEC
RBC # BLD AUTO: 3.03 M/UL
RBC #/AREA URNS AUTO: 1 /HPF (ref 0–4)
SATURATED IRON: 10 %
SODIUM SERPL-SCNC: 140 MMOL/L
SP GR UR STRIP: 1.02 (ref 1–1.03)
TOTAL IRON BINDING CAPACITY: 334 UG/DL
TRANSFERRIN SERPL-MCNC: 226 MG/DL
URN SPEC COLLECT METH UR: ABNORMAL
WBC # BLD AUTO: 8.24 K/UL
WBC #/AREA URNS AUTO: 2 /HPF (ref 0–5)

## 2019-01-05 PROCEDURE — 85018 HEMOGLOBIN: CPT | Mod: HCNC

## 2019-01-05 PROCEDURE — 93010 ELECTROCARDIOGRAM REPORT: CPT | Mod: HCNC,,, | Performed by: INTERNAL MEDICINE

## 2019-01-05 PROCEDURE — 63600175 PHARM REV CODE 636 W HCPCS: Mod: HCNC | Performed by: INTERNAL MEDICINE

## 2019-01-05 PROCEDURE — 81001 URINALYSIS AUTO W/SCOPE: CPT | Mod: HCNC

## 2019-01-05 PROCEDURE — 86850 RBC ANTIBODY SCREEN: CPT | Mod: HCNC

## 2019-01-05 PROCEDURE — 99220 PR INITIAL OBSERVATION CARE,LEVL III: CPT | Mod: HCNC,,, | Performed by: INTERNAL MEDICINE

## 2019-01-05 PROCEDURE — 86920 COMPATIBILITY TEST SPIN: CPT | Mod: HCNC

## 2019-01-05 PROCEDURE — 94761 N-INVAS EAR/PLS OXIMETRY MLT: CPT | Mod: HCNC

## 2019-01-05 PROCEDURE — 82272 OCCULT BLD FECES 1-3 TESTS: CPT | Mod: HCNC

## 2019-01-05 PROCEDURE — 99291 CRITICAL CARE FIRST HOUR: CPT | Mod: 25,HCNC

## 2019-01-05 PROCEDURE — 83036 HEMOGLOBIN GLYCOSYLATED A1C: CPT | Mod: HCNC

## 2019-01-05 PROCEDURE — 85014 HEMATOCRIT: CPT | Mod: HCNC

## 2019-01-05 PROCEDURE — G0378 HOSPITAL OBSERVATION PER HR: HCPCS | Mod: HCNC

## 2019-01-05 PROCEDURE — C9113 INJ PANTOPRAZOLE SODIUM, VIA: HCPCS | Mod: HCNC | Performed by: INTERNAL MEDICINE

## 2019-01-05 PROCEDURE — 85610 PROTHROMBIN TIME: CPT | Mod: HCNC

## 2019-01-05 PROCEDURE — 83540 ASSAY OF IRON: CPT | Mod: HCNC

## 2019-01-05 PROCEDURE — 99291 PR CRITICAL CARE, E/M 30-74 MINUTES: ICD-10-PCS | Mod: ,,, | Performed by: EMERGENCY MEDICINE

## 2019-01-05 PROCEDURE — 36415 COLL VENOUS BLD VENIPUNCTURE: CPT | Mod: HCNC

## 2019-01-05 PROCEDURE — 27000221 HC OXYGEN, UP TO 24 HOURS: Mod: HCNC

## 2019-01-05 PROCEDURE — C9113 INJ PANTOPRAZOLE SODIUM, VIA: HCPCS | Mod: HCNC | Performed by: EMERGENCY MEDICINE

## 2019-01-05 PROCEDURE — 82728 ASSAY OF FERRITIN: CPT | Mod: HCNC

## 2019-01-05 PROCEDURE — 93005 ELECTROCARDIOGRAM TRACING: CPT | Mod: HCNC

## 2019-01-05 PROCEDURE — 99291 CRITICAL CARE FIRST HOUR: CPT | Mod: ,,, | Performed by: EMERGENCY MEDICINE

## 2019-01-05 PROCEDURE — 99220 PR INITIAL OBSERVATION CARE,LEVL III: ICD-10-PCS | Mod: HCNC,,, | Performed by: INTERNAL MEDICINE

## 2019-01-05 PROCEDURE — 63600175 PHARM REV CODE 636 W HCPCS: Mod: HCNC | Performed by: EMERGENCY MEDICINE

## 2019-01-05 PROCEDURE — 96375 TX/PRO/DX INJ NEW DRUG ADDON: CPT | Mod: HCNC

## 2019-01-05 PROCEDURE — 25000003 PHARM REV CODE 250: Mod: HCNC | Performed by: INTERNAL MEDICINE

## 2019-01-05 PROCEDURE — 80053 COMPREHEN METABOLIC PANEL: CPT | Mod: HCNC

## 2019-01-05 PROCEDURE — 93010 EKG 12-LEAD: ICD-10-PCS | Mod: HCNC,,, | Performed by: INTERNAL MEDICINE

## 2019-01-05 PROCEDURE — 85025 COMPLETE CBC W/AUTO DIFF WBC: CPT | Mod: HCNC

## 2019-01-05 PROCEDURE — 96374 THER/PROPH/DIAG INJ IV PUSH: CPT | Mod: HCNC

## 2019-01-05 RX ORDER — ATORVASTATIN CALCIUM 20 MG/1
40 TABLET, FILM COATED ORAL DAILY
Status: DISCONTINUED | OUTPATIENT
Start: 2019-01-06 | End: 2019-01-08 | Stop reason: HOSPADM

## 2019-01-05 RX ORDER — GLUCAGON 1 MG
1 KIT INJECTION
Status: DISCONTINUED | OUTPATIENT
Start: 2019-01-05 | End: 2019-01-08 | Stop reason: HOSPADM

## 2019-01-05 RX ORDER — ASCORBIC ACID 250 MG
250 TABLET ORAL NIGHTLY
Status: DISCONTINUED | OUTPATIENT
Start: 2019-01-05 | End: 2019-01-08 | Stop reason: HOSPADM

## 2019-01-05 RX ORDER — BENAZEPRIL HYDROCHLORIDE 20 MG/1
40 TABLET ORAL EVERY MORNING
Status: DISCONTINUED | OUTPATIENT
Start: 2019-01-06 | End: 2019-01-08 | Stop reason: HOSPADM

## 2019-01-05 RX ORDER — IBUPROFEN 200 MG
16 TABLET ORAL
Status: DISCONTINUED | OUTPATIENT
Start: 2019-01-05 | End: 2019-01-08 | Stop reason: HOSPADM

## 2019-01-05 RX ORDER — CEFTRIAXONE 1 G/1
1 INJECTION, POWDER, FOR SOLUTION INTRAMUSCULAR; INTRAVENOUS
Status: COMPLETED | OUTPATIENT
Start: 2019-01-05 | End: 2019-01-05

## 2019-01-05 RX ORDER — CARVEDILOL 25 MG/1
25 TABLET ORAL 2 TIMES DAILY WITH MEALS
Status: DISCONTINUED | OUTPATIENT
Start: 2019-01-05 | End: 2019-01-08 | Stop reason: HOSPADM

## 2019-01-05 RX ORDER — SODIUM CHLORIDE 0.9 % (FLUSH) 0.9 %
5 SYRINGE (ML) INJECTION
Status: DISCONTINUED | OUTPATIENT
Start: 2019-01-05 | End: 2019-01-08 | Stop reason: HOSPADM

## 2019-01-05 RX ORDER — PANTOPRAZOLE SODIUM 40 MG/10ML
80 INJECTION, POWDER, LYOPHILIZED, FOR SOLUTION INTRAVENOUS
Status: COMPLETED | OUTPATIENT
Start: 2019-01-05 | End: 2019-01-05

## 2019-01-05 RX ORDER — IBUPROFEN 200 MG
24 TABLET ORAL
Status: DISCONTINUED | OUTPATIENT
Start: 2019-01-05 | End: 2019-01-08 | Stop reason: HOSPADM

## 2019-01-05 RX ORDER — SODIUM CHLORIDE 9 MG/ML
INJECTION, SOLUTION INTRAVENOUS CONTINUOUS
Status: DISCONTINUED | OUTPATIENT
Start: 2019-01-05 | End: 2019-01-05

## 2019-01-05 RX ORDER — NAPROXEN SODIUM 220 MG/1
81 TABLET, FILM COATED ORAL DAILY
Status: DISCONTINUED | OUTPATIENT
Start: 2019-01-06 | End: 2019-01-05

## 2019-01-05 RX ORDER — FERROUS SULFATE 325(65) MG
325 TABLET, DELAYED RELEASE (ENTERIC COATED) ORAL NIGHTLY
Status: DISCONTINUED | OUTPATIENT
Start: 2019-01-05 | End: 2019-01-08 | Stop reason: HOSPADM

## 2019-01-05 RX ORDER — AMLODIPINE BESYLATE 10 MG/1
10 TABLET ORAL DAILY
Status: DISCONTINUED | OUTPATIENT
Start: 2019-01-06 | End: 2019-01-08 | Stop reason: HOSPADM

## 2019-01-05 RX ORDER — SODIUM CHLORIDE 9 MG/ML
INJECTION, SOLUTION INTRAVENOUS CONTINUOUS
Status: DISCONTINUED | OUTPATIENT
Start: 2019-01-05 | End: 2019-01-06

## 2019-01-05 RX ORDER — PANTOPRAZOLE SODIUM 40 MG/10ML
40 INJECTION, POWDER, LYOPHILIZED, FOR SOLUTION INTRAVENOUS 2 TIMES DAILY
Status: DISCONTINUED | OUTPATIENT
Start: 2019-01-05 | End: 2019-01-08 | Stop reason: HOSPADM

## 2019-01-05 RX ADMIN — Medication 250 MG: at 10:01

## 2019-01-05 RX ADMIN — PANTOPRAZOLE SODIUM 80 MG: 40 INJECTION, POWDER, FOR SOLUTION INTRAVENOUS at 07:01

## 2019-01-05 RX ADMIN — SODIUM CHLORIDE: 0.9 INJECTION, SOLUTION INTRAVENOUS at 10:01

## 2019-01-05 RX ADMIN — SODIUM CHLORIDE: 0.9 INJECTION, SOLUTION INTRAVENOUS at 11:01

## 2019-01-05 RX ADMIN — FERROUS SULFATE TAB EC 325 MG (65 MG FE EQUIVALENT) 325 MG: 325 (65 FE) TABLET DELAYED RESPONSE at 10:01

## 2019-01-05 RX ADMIN — PANTOPRAZOLE SODIUM 40 MG: 40 INJECTION, POWDER, FOR SOLUTION INTRAVENOUS at 08:01

## 2019-01-05 RX ADMIN — CARVEDILOL 25 MG: 25 TABLET, FILM COATED ORAL at 06:01

## 2019-01-05 RX ADMIN — CEFTRIAXONE SODIUM 1 G: 1 INJECTION, POWDER, FOR SOLUTION INTRAMUSCULAR; INTRAVENOUS at 09:01

## 2019-01-05 NOTE — ED NOTES
Pt. Resting in bed in NAD. RR e/u. Continuous cardiac, BP, and O2 monitoring in progress. VS being monitoring continuously per MD orders. Pt. Offered bathroom assistance and denies need at this time. Explanation of care/wait provided. Bed in low, locked position with rails up and call bell in reach. Will continue to monitor.

## 2019-01-05 NOTE — ED TRIAGE NOTES
Patient presents to the hospital with complaints of 3 episodes of bright red bleeding from rectum with small clots last night and 2 episodes of vomiting. Patient's family reports that the patient had syncopal episode last night, lasting less than 30 seconds. Patient denies any pain or discomfort, also denies SOB. Patient has Hx. Of GI bleed about 4 years ago from bleeding ulcer.

## 2019-01-05 NOTE — MEDICAL/APP STUDENT
Hospital Medicine  History and Physical Exam    Team: [facility] [place] [place] [facility] MED D Alva Dutta MD  Admit Date: 1/5/2019  Chief Complaint: rectal bleeding  Patient information was obtained from patient, relative(s) and ER records  Primary care Physician: Kenneth Panda MD  Code status: Full Code    HPI:   89 y.o. female with history HTN, HLD, Afib,& GI bleed with PUD who presents for blood in her stool. She noted bleeding in her stool 1 week ago (Saturday), but up until then she has been in her usual fair state of health. She reports losing blood for 1 day, starting last night. She noted clots in her stool and blood in the toilet. Family members noted her losing consciousness for about 30 seconds, but no subsequent fall. She has been having some weakness lately. She has associated nausea and vomited a small amount of food yesterday x1 and today this morning in ER. She has not been taking any NSAIDS or anticoagulation. No fevers, chills, sick contacts, dizziness, or chest pain. Patient had a colonoscopy 4 years ago showing extensive diverticulosis.      Past Medical History: Patient has a past medical history of Atrial fibrillation, Depression, GI bleed, HTN (hypertension), and Hyperlipidemia.    Past Surgical History: Patient has a past surgical history that includes Wrist surgery and Hysterectomy.    Social History: Patient reports that  has never smoked. she has never used smokeless tobacco. She reports that she does not drink alcohol or use drugs.    Family History: family history includes Diabetes in her brother and mother; Hyperlipidemia in her brother, daughter, and sister; Hypertension in her brother, sister, and son; No Known Problems in her father.    Medications:   Prior to Admission medications    Medication Sig Start Date End Date Taking? Authorizing Provider   amLODIPine (NORVASC) 10 MG tablet TAKE 1 TABLET ONE TIME DAILY 6/1/18  Yes Ellie Che NP   atorvastatin (LIPITOR) 40 MG tablet TAKE 1 TABLET EVERY DAY  7/30/18  Yes Kenneth Panda MD   benazepril (LOTENSIN) 40 MG tablet Take 1 tablet (40 mg total) by mouth every morning. 1/29/18  Yes Kenneth Panda MD   carvedilol (COREG) 25 MG tablet Take 1 tablet (25 mg total) by mouth 2 (two) times daily with meals. 1/29/18  Yes Kenneth Panda MD   metFORMIN (GLUCOPHAGE) 500 MG tablet Take 2 tablets (1,000 mg total) by mouth daily with breakfast.  Patient taking differently: Take 500 mg by mouth daily with breakfast.  6/26/18 6/26/19 Yes Kenneth Panda MD   pantoprazole (PROTONIX) 40 MG tablet TAKE 1 TABLET EVERY DAY 12/18/17  Yes Kenneth Panda MD   aspirin 81 MG Chew Take 1 tablet (81 mg total) by mouth once daily. 1/20/15 1/5/19 Yes Harris Elam MD   CALCIUM CARBONATE/VITAMIN D3 (CALCIUM 500 + D, D3, ORAL) Take 1 tablet by mouth Daily.     Historical Provider, MD   cephALEXin (KEFLEX) 500 MG capsule  6/5/18   Historical Provider, MD   ciprofloxacin HCl (CIPRO) 500 MG tablet Take 1 tablet (500 mg total) by mouth every 12 (twelve) hours. 9/17/18 1/5/19  Kenneth Panda MD   ciprofloxacin-dexamethasone 0.3-0.1% (CIPRODEX) 0.3-0.1 % DrpS 4 drops to right ear BID for infection 7/23/18 1/5/19  Maurisio Lopez III, MD       Allergies: Patient has No Known Allergies.    ROS  Constitutional: no fever or chills  Respiratory: no cough or shortness of breath  Cardiovascular: no chest pain or palpitations  Gastrointestinal: (+) nausea/vomiting, no abdominal pain or (+) Blood in stool  Genitourinary: no hematuria or dysuria  Integument/Breast: no rash or pruritis  Hematologic/Lymphatic: no easy bruising or lymphadenopathy  Musculoskeletal: no arthralgias or myalgias  Neurological: no seizures or tremors  Behavioral/Psych: no depression or anxiety    PEx  Temp:  [98.2 °F (36.8 °C)]   Pulse:  [64-76]   Resp:  [18]   BP: (146-178)/(67-76)   SpO2:  [91 %-99 %]   Body mass index is 22.67 kg/m².     General appearance: no distress  Mental status: Alert and oriented x  3  HEENT:  conjunctivae/corneas clear, PERRL  Neck: supple, thyroid not enlarged  Pulm:   normal respiratory effort, CTA B, no c/w/r  Card: RRR, S1, S2 normal, no murmur, click, rub or gallop  Abd: soft, NT, ND, BS present; no masses, no organomegaly  Ext: no c/c/e  Pulses: 2+, symmetric  Skin: color, texture, turgor normal. No rashes or lesions  Neuro: CN II-XII grossly intact, no focal numbness or weakness, normal strength and tone       No intake or output data in the 24 hours ending 01/05/19 1046  Recent Labs   Lab 01/05/19  0813   WBC 8.24   HGB 8.9*   HCT 27.4*        Recent Labs   Lab 01/05/19  0706      K 4.5      CO2 27   BUN 30   CREATININE 1.0   *   CALCIUM 9.8     Recent Labs   Lab 01/05/19  0706   ALKPHOS 46*   ALT 9*   AST 21   ALBUMIN 3.6   PROT 7.2   BILITOT 0.4   INR 1.2        Active Hospital Problems    Diagnosis  POA    Gastrointestinal hemorrhage [K92.2]  Yes      Resolved Hospital Problems   No resolved problems to display.       Overview  89 y.o. female with history HTN, HLD, Afib,& GI bleed with PUD who presents for blood in her stool. Colonoscopy in 2015 noted multiple diverticula and EGD noted erythematous mucosa with a papule detected in the gastric body.       Assessment and Plan for Problems addressed today:    Gastrointestinal hemorrhage  Iron deficiency anemia   Acute blood anemia  · Colonoscopy in 2015 noted multiple diverticula & EGD in 2015 noted erythematous mucosa with a papule detected in the gastric body.  · Hemodynamically stable. CXR with no acute abnormalities.   · Ordered IV pantoprazole BID. Trend Hgb and ordering iron studies. C. Diff & stool studies ordered. CL diet as tolerated. Continue maintenance IVF for now.  · Ferrous sulfate 325 mg qhs and vitamin C 250 mg qhs - consider IV iron infusions    Hx of Atrial fibrillation   Hypertension  · Not on any chronic anticoagulation currently as atrial fibrillation was in setting of upper GI bleed and  has not required since  · Continued home benazepril, amlodipine & carvedilol     Type 2 Diabetes mellitus with HLD  · Holding home metformin while admitted. Monitor BG and order Low-dose insulin correction.   · Continued home atorvastatin        Diet: Diet clear liquid  DVT PPx: Holding AC. TEDs.  Anticoagulants   Medication Route Frequency       Provider  MD Kris Bernard Attestation: I personally scribed for Alva Dutta MD on 01/05/2019 at 5:42 PM. Electronically signed by kris Weeks on 01/05/2019 at 5:42 PM.

## 2019-01-05 NOTE — ED PROVIDER NOTES
Encounter Date: 1/5/2019    SCRIBE #1 NOTE: I, Marycarmen Vanegas, am scribing for, and in the presence of,  Dr. Sutherland. I have scribed the following portions of the note - Other sections scribed: HPI, ROS, PE.       History     Chief Complaint   Patient presents with    Rectal Bleeding     Pt daughter states they were visiting relatives in [place] and pt had two episodes of blood in her stool and toilet yesterday. Pt denies abdominal pain or cramping.      Time patient was seen by the provider: 6:56 AM    Ms. Birch is a 89 y.o. female with history HTN, HLD, Afib, GI bleed who presents to the ED with a complaint of blood in stool. Daughter reports patient had an episode of light blood stool a week ago and 2 episodes of bright red blood in stool yesterday. They were in [place] this past week visiting relatives, and drove in this morning and came straight to the ED. She reports the patient slept throughout the whole ride with no problems or complaints, but did have one episode of vomiting on arrival to ED. Patient denies any pain or fever. She was on blood thinners when she had a GI bleed 4 years ago, but is not on any at this time. Daughter notes her BP has been at baseline. Patient has an upcoming appointment with Internal Medicine, Dr. Panda on Monday. Denies fevers, chills, chest pain, SOB, diarrhea, constipation, known h/o diverticulosis, dysuria.      The history is provided by the patient.     Review of patient's allergies indicates:  No Known Allergies  Past Medical History:   Diagnosis Date    Atrial fibrillation     Depression     GI bleed     HTN (hypertension)     Hyperlipidemia     UTI (urinary tract infection)      Past Surgical History:   Procedure Laterality Date    COLONOSCOPY N/A 4/29/2015    Performed by Jackson Anderson MD at [facility] END[facility] (2ND FLR)    ECHOCARDIOGRAM-TRANSESOPHAGEAL N/A 8/2/2013    Performed by Alexi Surgeon at [facility] ALEXI    EGD (ESOPHAGOGASTRODUODENOSCOPY) N/A 9/5/2013    Performed  by Kenneth Payne MD at [facility] ENDO (4TH FLR)    EGD (ESOPHAGOGASTRODUODENOSCOPY) Left 8/2/2013    Performed by Kenneth Payne MD at [facility] ENDO (2ND FLR)    EGD (ESOPHAGOGASTRODUODENOSCOPY) Left 7/30/2013    Performed by Kenneth Payne MD at [facility] ENDO (2ND FLR)    ESOPHAGOGASTRODUODENOSCOPY (EGD) N/A 4/29/2015    Performed by Jackson Anderson MD at [facility] ENDO (2ND FLR)    HYSTERECTOMY      WRIST SURGERY       Family History   Problem Relation Age of Onset    Diabetes Mother     No Known Problems Father     Hypertension Sister     Hyperlipidemia Sister     Diabetes Brother     Hypertension Brother     Hyperlipidemia Brother     Hyperlipidemia Daughter     Hypertension Son      Social History     Tobacco Use    Smoking status: Never Smoker    Smokeless tobacco: Never Used   Substance Use Topics    Alcohol use: No     Alcohol/week: 0.0 oz    Drug use: No     Review of Systems   Constitutional: Negative for fever.   HENT: Negative.    Eyes: Negative for visual disturbance.   Respiratory: Negative.    Cardiovascular: Negative.    Gastrointestinal: Positive for blood in stool and vomiting.   Genitourinary: Negative.    Musculoskeletal: Negative.    Skin: Negative.    Neurological: Negative.    Hematological: Negative.    All other systems reviewed and are negative.      Physical Exam     Initial Vitals [01/05/19 0626]   BP Pulse Resp Temp SpO2   (!) 172/72 76 18 98.2 °F (36.8 °C) (!) 93 %      MAP       --         Physical Exam    Nursing note and vitals reviewed.  Constitutional: She appears well-developed and well-nourished. She is not diaphoretic. No distress.   HENT:   Head: Normocephalic and atraumatic.   Right Ear: External ear normal.   Left Ear: External ear normal.   Eyes: Conjunctivae and EOM are normal. Pupils are equal, round, and reactive to light. No scleral icterus.   Neck: Neck supple.   Cardiovascular: Normal rate, regular rhythm, normal heart sounds and intact distal pulses.    Pulmonary/Chest: Breath sounds normal. No respiratory distress. She has no wheezes. She has no rhonchi. She has no rales.   Abdominal: Soft. She exhibits no distension. There is no tenderness. There is no rebound and no guarding.   Genitourinary: Rectal exam shows guaiac positive stool. Guaiac positive stool. : Acceptable.  Genitourinary Comments: Soft stool in rectal vault, appears bloody (dark red). No external hemorrhoids.   Neurological: She is alert and oriented to person, place, and time. GCS score is 15. GCS eye subscore is 4. GCS verbal subscore is 5. GCS motor subscore is 6.   Skin: Skin is warm. Capillary refill takes less than 2 seconds. No rash noted.   Psychiatric: She has a normal mood and affect.         ED Course   Procedures  Labs Reviewed   COMPREHENSIVE METABOLIC PANEL - Abnormal; Notable for the following components:       Result Value    Glucose 148 (*)     Alkaline Phosphatase 46 (*)     ALT 9 (*)     eGFR if  56.5 (*)     eGFR if non  49.0 (*)     All other components within normal limits   URINALYSIS, REFLEX TO URINE CULTURE - Abnormal; Notable for the following components:    Appearance, UA Hazy (*)     Nitrite, UA Positive (*)     All other components within normal limits    Narrative:     Preferred Collection Type->Urine, Clean Catch   URINALYSIS MICROSCOPIC - Abnormal; Notable for the following components:    Bacteria, UA Many (*)     All other components within normal limits    Narrative:     Preferred Collection Type->Urine, Clean Catch   CBC W/ AUTO DIFFERENTIAL - Abnormal; Notable for the following components:    RBC 3.03 (*)     Hemoglobin 8.9 (*)     Hematocrit 27.4 (*)     Gran% 83.2 (*)     Lymph% 11.9 (*)     All other components within normal limits   PROTIME-INR   TYPE & SCREEN          Imaging Results          X-Ray Chest AP Portable (Final result)  Result time 01/05/19 07:28:21    Final result by Chacha Xiong MD (01/05/19  07:28:21)                 Impression:      No acute intrathoracic abnormality identified on this single radiographic view of the chest.      Electronically signed by: Chacha Xiong MD  Date:    01/05/2019  Time:    07:28             Narrative:    EXAMINATION:  XR CHEST AP PORTABLE    CLINICAL HISTORY:  Hypoxemia    TECHNIQUE:  Single frontal view of the chest was performed.    COMPARISON:  07/29/2013    FINDINGS:  Examination is slightly limited by patient rotation.  There are cardiac monitoring leads overlying the chest.  The cardiomediastinal silhouette remains prominent with atherosclerosis of the thoracic aorta.  The visualized airway is unremarkable.  The lungs appear symmetrically without evidence of large pleural effusion, focal consolidation or pneumothorax is appreciated.Visualized osseous structures demonstrate degenerative change without acute abnormality.                                 Medical Decision Making:   History:   Old Medical Records: I decided to obtain old medical records.  Clinical Tests:   Lab Tests: Ordered and Reviewed  Radiological Study: Ordered and Reviewed  Medical Tests: Ordered and Reviewed  ED Management:  Vitals normal except mild hypoxia. Afebrile. Here w/ GI bleed. Overall well appearing. On no blood thinners. FOBT+ CBC, CMP, UA, CXR ordered. Protonix given IV. Has oxygen requirement in ED, which is new.     Labs viewed. Significant for anemia (stable from prior). UA w/ nitrite positive. No need for transfusion at this time. Discussed with internal medicine who will admit; they recommend treating for UTI w/ rocephin; ordered.   Other:   I have discussed this case with another health care provider.            Scribe Attestation:   Scribe #1: I performed the above scribed service and the documentation accurately describes the services I performed. I attest to the accuracy of the note.    Attending Attestation:         Attending Critical Care:   Critical Care Times:    ==============================================================  · Total Critical Care Time - exclusive of procedural time: 30 minutes.  ==============================================================  Critical care was necessary to treat or prevent imminent or life-threatening deterioration of the following conditions: GI bleeding.   Critical care was time spent personally by me on the following activities: obtaining history from patient or relative, examination of patient, review of old charts, ordering lab, x-rays, and/or EKG, development of treatment plan with patient or relative, ordering and performing treatments and interventions, evaluation of patient's response to treatment, discussion with consultants, interpretation of cardiac measurements and re-evaluation of patient's conition.   Critical Care Condition: potentially life-threatening                  Clinical Impression:   The primary encounter diagnosis was Gastrointestinal hemorrhage, unspecified gastrointestinal hemorrhage type. Diagnoses of Hypoxia, Gastrointestinal hemorrhage, Anemia, unspecified type, and Urinary tract infection without hematuria, site unspecified were also pertinent to this visit.      Disposition:   Disposition: Admitted  Condition: Stable                        Bennett Sutherland MD  01/06/19 6578

## 2019-01-05 NOTE — PLAN OF CARE
Problem: Adult Inpatient Plan of Care  Goal: Plan of Care Review  Outcome: Ongoing (interventions implemented as appropriate)  Patient arrived to room. Admit assessment completed. Plan of care discussed with patient. Will monitor

## 2019-01-05 NOTE — ED NOTES
Patient identifiers have been checked and are correct for Sachin Birch. Patient changed into hospital gown.    LOC: The patient is awake, alert, and aware of environment. The patient is oriented x 3 and speaking appropriately.   APPEARANCE: No acute distress noted.   PSYCHOSOCIAL: Patient is calm and cooperative. Patients insight and judgement are appropriate to situation. Appears clean, well maintained, with clothing appropriate to environment. No evidence of delusions, hallucinations, or psychosis.  SKIN: The skin is warm, dry, and intact. No bruising/discolorations noted.  RESPIRATORY: Airway is open and patent. Bilateral chest rise and fall. Respirations are spontaneous, even and unlabored. Normal effort and rate noted. No accessory muscle use noted.   CARDIAC: Patient has a normal HR. Capillary refill <3 seconds. Minimal swelling to feet bilaterally.  ABDOMEN: Soft and non tender to palpation. No distention noted. Bowel sounds present in all 4 quadrants.   URINARY: Patient reports routine urination without pain, frequency, or urgency. Voids independently. Reports urine appears raymond/yellow in color.  EXTREMITIES: No redness, heat, swelling, deformity, or pain.  PULSES: 2+ and equal in all extremities.   NEUROLOGIC: Eyes open spontaneously. Speech clear. Tolerating saliva secretions well. Able to follow commands, demonstrating ability to actively and appropriately communicate within context of current conversation. Symmetrical facial muscles. Moving all extremities well with no noted weakness. Adequate muscle tone present. Movement is purposeful.   MUSCULOSKELETAL: No obvious deformities noted.

## 2019-01-06 LAB
25(OH)D3+25(OH)D2 SERPL-MCNC: 30 NG/ML
BASOPHILS # BLD AUTO: 0.05 K/UL
BASOPHILS NFR BLD: 0.8 %
DIFFERENTIAL METHOD: ABNORMAL
EOSINOPHIL # BLD AUTO: 0.1 K/UL
EOSINOPHIL NFR BLD: 2.1 %
ERYTHROCYTE [DISTWIDTH] IN BLOOD BY AUTOMATED COUNT: 13.4 %
HCT VFR BLD AUTO: 24.3 %
HCT VFR BLD AUTO: 25.2 %
HGB BLD-MCNC: 7.4 G/DL
HGB BLD-MCNC: 7.7 G/DL
IMM GRANULOCYTES # BLD AUTO: 0.04 K/UL
IMM GRANULOCYTES NFR BLD AUTO: 0.6 %
LYMPHOCYTES # BLD AUTO: 1.6 K/UL
LYMPHOCYTES NFR BLD: 25 %
MCH RBC QN AUTO: 29.1 PG
MCHC RBC AUTO-ENTMCNC: 30.6 G/DL
MCV RBC AUTO: 95 FL
MONOCYTES # BLD AUTO: 0.6 K/UL
MONOCYTES NFR BLD: 9 %
NEUTROPHILS # BLD AUTO: 3.9 K/UL
NEUTROPHILS NFR BLD: 62.5 %
NRBC BLD-RTO: 0 /100 WBC
PLATELET # BLD AUTO: 168 K/UL
PMV BLD AUTO: 10.6 FL
POCT GLUCOSE: 101 MG/DL (ref 70–110)
POCT GLUCOSE: 113 MG/DL (ref 70–110)
POCT GLUCOSE: 163 MG/DL (ref 70–110)
POCT GLUCOSE: 230 MG/DL (ref 70–110)
RBC # BLD AUTO: 2.65 M/UL
WBC # BLD AUTO: 6.24 K/UL

## 2019-01-06 PROCEDURE — 63600175 PHARM REV CODE 636 W HCPCS: Mod: HCNC | Performed by: INTERNAL MEDICINE

## 2019-01-06 PROCEDURE — C9113 INJ PANTOPRAZOLE SODIUM, VIA: HCPCS | Mod: HCNC | Performed by: INTERNAL MEDICINE

## 2019-01-06 PROCEDURE — 36415 COLL VENOUS BLD VENIPUNCTURE: CPT | Mod: HCNC

## 2019-01-06 PROCEDURE — G0378 HOSPITAL OBSERVATION PER HR: HCPCS | Mod: HCNC

## 2019-01-06 PROCEDURE — 85018 HEMOGLOBIN: CPT | Mod: HCNC,59

## 2019-01-06 PROCEDURE — 99225 PR SUBSEQUENT OBSERVATION CARE,LEVEL II: ICD-10-PCS | Mod: HCNC,,, | Performed by: INTERNAL MEDICINE

## 2019-01-06 PROCEDURE — 85025 COMPLETE CBC W/AUTO DIFF WBC: CPT | Mod: HCNC

## 2019-01-06 PROCEDURE — 99225 PR SUBSEQUENT OBSERVATION CARE,LEVEL II: CPT | Mod: HCNC,,, | Performed by: INTERNAL MEDICINE

## 2019-01-06 PROCEDURE — 82306 VITAMIN D 25 HYDROXY: CPT | Mod: HCNC

## 2019-01-06 PROCEDURE — 25000003 PHARM REV CODE 250: Mod: HCNC | Performed by: INTERNAL MEDICINE

## 2019-01-06 PROCEDURE — 85014 HEMATOCRIT: CPT | Mod: HCNC,59

## 2019-01-06 PROCEDURE — 82962 GLUCOSE BLOOD TEST: CPT | Mod: HCNC

## 2019-01-06 RX ORDER — ACETAMINOPHEN 325 MG/1
650 TABLET ORAL EVERY 6 HOURS PRN
Status: DISCONTINUED | OUTPATIENT
Start: 2019-01-06 | End: 2019-01-08 | Stop reason: HOSPADM

## 2019-01-06 RX ORDER — INSULIN ASPART 100 [IU]/ML
0-5 INJECTION, SOLUTION INTRAVENOUS; SUBCUTANEOUS
Status: DISCONTINUED | OUTPATIENT
Start: 2019-01-06 | End: 2019-01-08 | Stop reason: HOSPADM

## 2019-01-06 RX ADMIN — CARVEDILOL 25 MG: 25 TABLET, FILM COATED ORAL at 08:01

## 2019-01-06 RX ADMIN — Medication 250 MG: at 10:01

## 2019-01-06 RX ADMIN — PANTOPRAZOLE SODIUM 40 MG: 40 INJECTION, POWDER, FOR SOLUTION INTRAVENOUS at 08:01

## 2019-01-06 RX ADMIN — AMLODIPINE BESYLATE 10 MG: 10 TABLET ORAL at 08:01

## 2019-01-06 RX ADMIN — FERROUS SULFATE TAB EC 325 MG (65 MG FE EQUIVALENT) 325 MG: 325 (65 FE) TABLET DELAYED RESPONSE at 10:01

## 2019-01-06 RX ADMIN — ATORVASTATIN CALCIUM 40 MG: 20 TABLET, FILM COATED ORAL at 08:01

## 2019-01-06 RX ADMIN — BENAZEPRIL HYDROCHLORIDE 40 MG: 20 TABLET, FILM COATED ORAL at 05:01

## 2019-01-06 RX ADMIN — PANTOPRAZOLE SODIUM 40 MG: 40 INJECTION, POWDER, FOR SOLUTION INTRAVENOUS at 10:01

## 2019-01-06 RX ADMIN — CARVEDILOL 25 MG: 25 TABLET, FILM COATED ORAL at 05:01

## 2019-01-06 NOTE — PLAN OF CARE
Problem: Adult Inpatient Plan of Care  Goal: Plan of Care Review  Outcome: Ongoing (interventions implemented as appropriate)  Pt is alert and oriented and is able to ambulate with standby assist. Pt had no active bleeding this shift. No complaints of pain. Pt is receiving continuous IV fluids of normal saline at 75 ml/hr. Pt vitals are stable. Placed pt on 1 L of O2 nasal canula. Pt was sating in the high 80's without O2.  Still waiting on pt to have bm to be sent for labs. Pt remains on c-diff precautions at this time. Will continue to monitor, will endorse to oncoming nurse.

## 2019-01-06 NOTE — PLAN OF CARE
Problem: Adult Inpatient Plan of Care  Goal: Plan of Care Review  Outcome: Ongoing (interventions implemented as appropriate)  Safety measures maintained this shift. VSS on 1L/O2 per NC. No c/o pain/discomfort. Ambulates with stand by assistance. No BM on this shift. C. Diff precautions d/c'd. Bed in lowest locked position and call light within reach. NAD noted. Daughter at bedside. Will continue to monitor.

## 2019-01-06 NOTE — MEDICAL/APP STUDENT
Hospital Medicine  Progress note    Patient Name: Sachin Birch  MRN: [id number]  Team: [facility] MED D Alva Dutta MD  Admit Date: 1/5/2019  Code status: Full Code    Principal Problem:  Gastrointestinal hemorrhage    Interval hx:  Reports feeling well. No new complaints.     ROS   Respiratory: no cough or shortness of breath  Cardiovascular: no chest pain or palpitations  Gastrointestinal: no nausea or vomiting, no abdominal pain or change in bowel habits  Behavioral/Psych: no depression or anxiety      PEx  Temp:  [97.9 °F (36.6 °C)-98.9 °F (37.2 °C)]   Pulse:  [65-73]   Resp:  [16-18]   BP: (151-167)/(69-94)   SpO2:  [88 %-96 %]     Intake/Output Summary (Last 24 hours) at 1/6/2019 1039  Last data filed at 1/6/2019 0539  Gross per 24 hour   Intake 1353.75 ml   Output --   Net 1353.75 ml       General Appearance: no acute distress   Heart: regular rate and rhythm  Respiratory: Normal respiratory effort, no crackles   Abdomen: Soft, non-tender; bowel sounds active  Skin: intact.  Neurologic:  No focal numbness or weakness  Mental status: Alert, oriented x 4, affect appropriate     Recent Labs   Lab 01/05/19  0813 01/05/19  1814 01/06/19  0417   WBC 8.24  --  6.24   HGB 8.9* 8.4* 7.7*   HCT 27.4* 26.6* 25.2*     --  168     Recent Labs   Lab 01/05/19  0706      K 4.5      CO2 27   BUN 30   CREATININE 1.0   *   CALCIUM 9.8     Recent Labs   Lab 01/05/19  0706   ALKPHOS 46*   ALT 9*   AST 21   ALBUMIN 3.6   PROT 7.2   BILITOT 0.4   INR 1.2      Recent Labs   Lab 01/05/19  2230 01/06/19  0901   POCTGLUCOSE 134* 113*       Scheduled Meds:   amLODIPine  10 mg Oral Daily    ascorbic acid (vitamin C)  250 mg Oral QHS    atorvastatin  40 mg Oral Daily    benazepril  40 mg Oral QAM    carvedilol  25 mg Oral BID WM    ferrous sulfate  325 mg Oral QHS    pantoprazole  40 mg Intravenous BID     Continuous Infusions:  As Needed:  dextrose 50%, dextrose 50%, glucagon (human recombinant),  glucose, glucose, sodium chloride 0.9%    Active Hospital Problems    Diagnosis  POA    *Gastrointestinal hemorrhage [K92.2]  Yes    Acute blood loss anemia [D62]  Yes    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       Overview  89 y.o. female with history HTN, HLD, Afib,& GI bleed with PUD who presents for blood in her stool. Colonoscopy in 2015 noted multiple diverticula and EGD noted erythematous mucosa with a papule detected in the gastric body.       Assessment and Plan for Problems addressed today:     Gastrointestinal hemorrhage  Iron deficiency anemia   Acute blood anemia  · Colonoscopy in 2015 noted multiple diverticula & EGD in 2015 noted erythematous mucosa with a papule detected in the gastric body.  · Hemodynamically stable. CXR with no acute abnormalities.   · Probably diverticular. Ordered IV pantoprazole BID. Trend Hgb and ordering iron studies. C. Diff & stool studies ordered. CL diet as tolerated. Continue maintenance IVF for now.  · Iron studies consistent of iron deficiency anemia with low saturated iron and ferritin. Ferrous sulfate 325 mg QHS and vitamin C 250 mg QHS - consider IV iron infusions   · Hgb 8.4 > 7.7; Monitoring. No further BMs. Advancing diet to regular as tolerated. (1/6)      Hx of Atrial fibrillation   Hypertension  · Not on any chronic anticoagulation currently as atrial fibrillation was in setting of upper GI bleed and has not recurred since  · Continued home benazepril, amlodipine & carvedilol      Type 2 Diabetes mellitus with HLD  DM II with HTN and CKD II  · A1c (1/5): 6%  · Holding home metformin while admitted. Monitor BG and order Low-dose insulin correction.   · Continued home  atorvastatin      Asymptomatic bacteruria  · Given ceftriaxone in ER. WBC 2 in UA. Will not continue antibiotic.       Diet: Diet clear liquid  DVT Prophylaxis: Holding in setting of GIB. TEDs.   Anticoagulants   Medication Route Frequency       L/D/A:  PIV x2    Discharge plan and follow up  Home or Self Care      Provider  Alva Dutta MD  [facility] HOSP MED D   Department of Hospital Medicine    Scribe Attestation: I personally scribed for Alva Dutta MD on 01/06/2019 at 10:39 AM. Electronically signed by kris Weeks on 01/06/2019 at 10:39 AM.

## 2019-01-06 NOTE — H&P
Physician Attestation for Scribe:  I, Alva Dutta MD, personally performed the services described in this documentation. All medical record entries made by the scribe were at my direction and in my presence.  I have reviewed the chart and agree that the record reflects my personal performance and is accurate and complete.   Alva Dutta MD    [facility] Medicine  History and Physical Exam    Team: [facility] MED D Alva Dutta MD  Admit Date: 1/5/2019  Chief Complaint: rectal bleeding  Patient information was obtained from patient, relative(s) and ER records  Primary care Physician: Kenneth Panda MD  Code status: Full Code    HPI:   89 y.o. female with history HTN, HLD, Afib,& GI bleed with PUD who presents for blood in her stool. She noted bleeding in her stool 1 week ago (Saturday), but up until then she has been in her usual fair state of health. She reports losing blood for 1 day, starting last night. She noted clots in her stool and blood in the toilet. Family members noted her losing consciousness for about 30 seconds, but no subsequent fall. She has been having some weakness lately. She has associated nausea and vomited a small amount of food yesterday x1 and today this morning in ER. She has not been taking any NSAIDS or anticoagulation. No fevers, chills, sick contacts, dizziness, or chest pain. Patient had a colonoscopy 4 years ago showing extensive diverticulosis.      Past Medical History: Patient has a past medical history of Atrial fibrillation, Depression, GI bleed, HTN (hypertension), Hyperlipidemia, and UTI (urinary tract infection).    Past Surgical History: Patient has a past surgical history that includes Wrist surgery and Hysterectomy.    Social History: Patient reports that  has never smoked. she has never used smokeless tobacco. She reports that she does not drink alcohol or use drugs.    Family History: family history includes Diabetes in her brother and mother; Hyperlipidemia in her brother,  daughter, and sister; Hypertension in her brother, sister, and son; No Known Problems in her father.    Medications:   Prior to Admission medications    Medication Sig Start Date End Date Taking? Authorizing Provider   amLODIPine (NORVASC) 10 MG tablet TAKE 1 TABLET ONE TIME DAILY 6/1/18  Yes Ellie Che NP   atorvastatin (LIPITOR) 40 MG tablet TAKE 1 TABLET EVERY DAY 7/30/18  Yes Kenneth Panda MD   benazepril (LOTENSIN) 40 MG tablet Take 1 tablet (40 mg total) by mouth every morning. 1/29/18  Yes Kenneth Panda MD   carvedilol (COREG) 25 MG tablet Take 1 tablet (25 mg total) by mouth 2 (two) times daily with meals. 1/29/18  Yes Kenneth Panda MD   metFORMIN (GLUCOPHAGE) 500 MG tablet Take 2 tablets (1,000 mg total) by mouth daily with breakfast.  Patient taking differently: Take 500 mg by mouth daily with breakfast.  6/26/18 6/26/19 Yes Kenneth Panda MD   pantoprazole (PROTONIX) 40 MG tablet TAKE 1 TABLET EVERY DAY 12/18/17  Yes Kenneth Panda MD   aspirin 81 MG Chew Take 1 tablet (81 mg total) by mouth once daily. 1/20/15 1/5/19 Yes Harris Elam MD   CALCIUM CARBONATE/VITAMIN D3 (CALCIUM 500 + D, D3, ORAL) Take 1 tablet by mouth Daily.     Historical Provider, MD   cephALEXin (KEFLEX) 500 MG capsule  6/5/18   Historical Provider, MD   ciprofloxacin HCl (CIPRO) 500 MG tablet Take 1 tablet (500 mg total) by mouth every 12 (twelve) hours. 9/17/18 1/5/19  Kenneth Panda MD   ciprofloxacin-dexamethasone 0.3-0.1% (CIPRODEX) 0.3-0.1 % DrpS 4 drops to right ear BID for infection 7/23/18 1/5/19  Maurisio Lopez III, MD       Allergies: Patient has No Known Allergies.    ROS  Constitutional: no fever or chills  Respiratory: no cough or shortness of breath  Cardiovascular: no chest pain or palpitations  Gastrointestinal: (+) nausea/vomiting, no abdominal pain or (+) Blood in stool  Genitourinary: no hematuria or dysuria  Integument/Breast: no rash or pruritis  Hematologic/Lymphatic: no easy  bruising or lymphadenopathy  Musculoskeletal: no arthralgias or myalgias  Neurological: no seizures or tremors  Behavioral/Psych: no depression or anxiety    PEx  Temp:  [98.2 °F (36.8 °C)-98.9 °F (37.2 °C)]   Pulse:  [64-76]   Resp:  [16-18]   BP: (146-178)/(67-76)   SpO2:  [91 %-99 %]   Body mass index is 22.76 kg/m².     General appearance: no distress  Mental status: Alert and oriented x 3  HEENT:  conjunctivae/corneas clear, PERRL  Neck: supple, thyroid not enlarged  Pulm:   normal respiratory effort, CTA B, no c/w/r  Card: RRR, S1, S2 normal, no murmur, click, rub or gallop  Abd: soft, NT, ND, BS present; no masses, no organomegaly  Ext: no c/c/e  Pulses: 2+, symmetric  Skin: color, texture, turgor normal. No rashes or lesions  Neuro: CN II-XII grossly intact, no focal numbness or weakness, normal strength and tone         Intake/Output Summary (Last 24 hours) at 1/5/2019 2139  Last data filed at 1/5/2019 1751  Gross per 24 hour   Intake 480 ml   Output --   Net 480 ml     Recent Labs   Lab 01/05/19  0813 01/05/19  1814   WBC 8.24  --    HGB 8.9* 8.4*   HCT 27.4* 26.6*     --      Recent Labs   Lab 01/05/19  0706      K 4.5      CO2 27   BUN 30   CREATININE 1.0   *   CALCIUM 9.8     Recent Labs   Lab 01/05/19  0706   ALKPHOS 46*   ALT 9*   AST 21   ALBUMIN 3.6   PROT 7.2   BILITOT 0.4   INR 1.2        Active Hospital Problems    Diagnosis  POA    Gastrointestinal hemorrhage [K92.2]  Yes      Resolved Hospital Problems   No resolved problems to display.       Overview  89 y.o. female with history HTN, HLD, Afib,& GI bleed with PUD who presents for blood in her stool. Colonoscopy in 2015 noted multiple diverticula and EGD noted erythematous mucosa with a papule detected in the gastric body.       Assessment and Plan for Problems addressed today:    Gastrointestinal hemorrhage  Iron deficiency anemia   Acute blood anemia  · Colonoscopy in 2015 noted multiple diverticula & EGD in 2015  noted erythematous mucosa with a papule detected in the gastric body.  · Hemodynamically stable. CXR with no acute abnormalities.   · Probably diverticular. Ordered IV pantoprazole BID. Trend Hgb and ordering iron studies. C. Diff & stool studies ordered. CL diet as tolerated. Continue maintenance IVF for now.  · Ferrous sulfate 325 mg qhs and vitamin C 250 mg qhs - consider IV iron infusions    Hx of Atrial fibrillation   Hypertension  · Not on any chronic anticoagulation currently as atrial fibrillation was in setting of upper GI bleed and has not required since  · Continued home benazepril, amlodipine & carvedilol     Type 2 Diabetes mellitus with HLD  DM II with HTN and CKD II  · Holding home metformin while admitted. Monitor BG and order Low-dose insulin correction.   · Continued home atorvastatin     Asymptomatic bacteruria  Given ceftriaxone in ER. WBC 2 in UA. Will not continue    Diet: Diet clear liquid  DVT PPx: Holding AC. TEDs.  Anticoagulants   Medication Route Frequency       Provider  MD Kris Bernard Attestation: I personally scribed for Alva Dutta MD on 01/05/2019 at 5:42 PM. Electronically signed by kris Weeks on 01/05/2019 at 5:42 PM.

## 2019-01-07 LAB
BLD PROD TYP BPU: NORMAL
BLOOD UNIT EXPIRATION DATE: NORMAL
BLOOD UNIT TYPE CODE: 5100
BLOOD UNIT TYPE: NORMAL
CODING SYSTEM: NORMAL
DISPENSE STATUS: NORMAL
POCT GLUCOSE: 130 MG/DL (ref 70–110)
POCT GLUCOSE: 131 MG/DL (ref 70–110)
POCT GLUCOSE: 182 MG/DL (ref 70–110)
TRANS ERYTHROCYTES VOL PATIENT: NORMAL ML

## 2019-01-07 PROCEDURE — G0378 HOSPITAL OBSERVATION PER HR: HCPCS | Mod: HCNC

## 2019-01-07 PROCEDURE — 36430 TRANSFUSION BLD/BLD COMPNT: CPT | Mod: HCNC

## 2019-01-07 PROCEDURE — 63600175 PHARM REV CODE 636 W HCPCS: Mod: HCNC | Performed by: INTERNAL MEDICINE

## 2019-01-07 PROCEDURE — P9021 RED BLOOD CELLS UNIT: HCPCS | Mod: HCNC

## 2019-01-07 PROCEDURE — 99225 PR SUBSEQUENT OBSERVATION CARE,LEVEL II: CPT | Mod: HCNC,,, | Performed by: INTERNAL MEDICINE

## 2019-01-07 PROCEDURE — 25000003 PHARM REV CODE 250: Mod: HCNC | Performed by: INTERNAL MEDICINE

## 2019-01-07 PROCEDURE — 82962 GLUCOSE BLOOD TEST: CPT | Mod: HCNC

## 2019-01-07 PROCEDURE — 99225 PR SUBSEQUENT OBSERVATION CARE,LEVEL II: ICD-10-PCS | Mod: HCNC,,, | Performed by: INTERNAL MEDICINE

## 2019-01-07 PROCEDURE — C9113 INJ PANTOPRAZOLE SODIUM, VIA: HCPCS | Mod: HCNC | Performed by: INTERNAL MEDICINE

## 2019-01-07 RX ORDER — SODIUM FERRIC GLUCONATE COMPLEX IN SUCROSE 12.5 MG/ML
125 INJECTION INTRAVENOUS DAILY
Status: DISCONTINUED | OUTPATIENT
Start: 2019-01-07 | End: 2019-01-07

## 2019-01-07 RX ORDER — HYDROCODONE BITARTRATE AND ACETAMINOPHEN 500; 5 MG/1; MG/1
TABLET ORAL
Status: DISCONTINUED | OUTPATIENT
Start: 2019-01-07 | End: 2019-01-08 | Stop reason: HOSPADM

## 2019-01-07 RX ADMIN — Medication 250 MG: at 08:01

## 2019-01-07 RX ADMIN — ATORVASTATIN CALCIUM 40 MG: 20 TABLET, FILM COATED ORAL at 09:01

## 2019-01-07 RX ADMIN — SODIUM CHLORIDE 125 MG: 9 INJECTION, SOLUTION INTRAVENOUS at 06:01

## 2019-01-07 RX ADMIN — AMLODIPINE BESYLATE 10 MG: 10 TABLET ORAL at 09:01

## 2019-01-07 RX ADMIN — CARVEDILOL 25 MG: 25 TABLET, FILM COATED ORAL at 06:01

## 2019-01-07 RX ADMIN — PANTOPRAZOLE SODIUM 40 MG: 40 INJECTION, POWDER, FOR SOLUTION INTRAVENOUS at 08:01

## 2019-01-07 RX ADMIN — FERROUS SULFATE TAB EC 325 MG (65 MG FE EQUIVALENT) 325 MG: 325 (65 FE) TABLET DELAYED RESPONSE at 08:01

## 2019-01-07 RX ADMIN — BENAZEPRIL HYDROCHLORIDE 40 MG: 20 TABLET, FILM COATED ORAL at 06:01

## 2019-01-07 RX ADMIN — CARVEDILOL 25 MG: 25 TABLET, FILM COATED ORAL at 03:01

## 2019-01-07 RX ADMIN — PANTOPRAZOLE SODIUM 40 MG: 40 INJECTION, POWDER, FOR SOLUTION INTRAVENOUS at 09:01

## 2019-01-07 NOTE — MEDICAL/APP STUDENT
Hospital Medicine  Progress note    Patient Name: Sachin Birch  MRN: [id number]  Team: [facility] [place] [facility] MED D Alva Dutta MD  Admit Date: 1/5/2019  Code status: Full Code    Principal Problem:  Gastrointestinal hemorrhage    Interval hx:  Patient with no new complaints.    ROS   Respiratory: no cough or shortness of breath  Cardiovascular: no chest pain or palpitations  Gastrointestinal: no nausea or vomiting, no abdominal pain or change in bowel habits  Behavioral/Psych: no depression or anxiety      PEx  Temp:  [98 °F (36.7 °C)-99.2 °F (37.3 °C)]   Pulse:  [60-68]   Resp:  [14-18]   BP: (139-160)/(62-81)   SpO2:  [88 %-97 %]     Intake/Output Summary (Last 24 hours) at 1/7/2019 0831  Last data filed at 1/7/2019 0000  Gross per 24 hour   Intake 660 ml   Output --   Net 660 ml       General Appearance: no acute distress   Heart: regular rate and rhythm  Respiratory: Normal respiratory effort, no crackles   Abdomen: Soft, non-tender; bowel sounds active  Skin: intact.  Neurologic:  No focal numbness or weakness  Mental status: Alert, oriented x 4, affect appropriate     Recent Labs   Lab 01/05/19  0813 01/05/19  1814 01/06/19  0417 01/06/19  1810   WBC 8.24  --  6.24  --    HGB 8.9* 8.4* 7.7* 7.4*   HCT 27.4* 26.6* 25.2* 24.3*     --  168  --      Recent Labs   Lab 01/05/19  0706      K 4.5      CO2 27   BUN 30   CREATININE 1.0   *   CALCIUM 9.8     Recent Labs   Lab 01/05/19  0706   ALKPHOS 46*   ALT 9*   AST 21   ALBUMIN 3.6   PROT 7.2   BILITOT 0.4   INR 1.2      Recent Labs   Lab 01/05/19  2230 01/06/19  0901 01/06/19  1448 01/06/19  1828 01/06/19 2024 01/07/19  0819   POCTGLUCOSE 134* 113* 101 230* 163* 131*       Scheduled Meds:   amLODIPine  10 mg Oral Daily    ascorbic acid (vitamin C)  250 mg Oral QHS    atorvastatin  40 mg Oral Daily    benazepril  40 mg Oral QAM    carvedilol  25 mg Oral BID WM    ferrous sulfate  325 mg Oral QHS    pantoprazole  40 mg Intravenous BID      Continuous Infusions:  As Needed:  acetaminophen, dextrose 50%, dextrose 50%, glucagon (human recombinant), glucose, glucose, insulin aspart U-100, sodium chloride 0.9%    Active Hospital Problems    Diagnosis  POA    *Gastrointestinal hemorrhage [K92.2]  Yes    Acute blood loss anemia [D62]  Yes    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       Overview  89 y.o. female with history HTN, HLD, Afib,& GI bleed with PUD who presents for blood in her stool. Colonoscopy in 2015 noted multiple diverticula and EGD noted erythematous mucosa with a papule detected in the gastric body.       Assessment and Plan for Problems addressed today:     Gastrointestinal hemorrhage  Iron deficiency anemia   Acute blood anemia  Acute hypoxic respiratory failure  · Colonoscopy in 2015 noted multiple diverticula & EGD in 2015 noted erythematous mucosa with a papule detected in the gastric body.  · Hemodynamically stable. CXR with no acute abnormalities.   · Probably diverticular. Ordered IV pantoprazole BID. Trend Hgb and ordering iron studies. C. Diff & stool studies ordered. CL diet as tolerated. Continue maintenance IVF for now.  · Iron studies consistent of iron deficiency anemia with low saturated iron and ferritin. Ferrous sulfate 325 mg QHS and vitamin C 250 mg QHS - consider IV iron infusions   · Hgb 8.4 > 7.7; Monitoring. No further BMs. Advancing diet to regular as tolerated. (1/6)   · Patient hypoxic at 88-90% on RA. Ordering CXR. Hgb 7.4; ordering 1 U pRBC and IV ferric gluconate. (1/7)     Hx of Atrial fibrillation   Hypertension  · Not on any chronic anticoagulation currently as atrial fibrillation was in setting of upper GI bleed  and has not recurred since  · Continued home benazepril, amlodipine & carvedilol      Type 2 Diabetes mellitus with HLD  DM II with HTN and CKD II  · A1c (1/5): 6%  · Holding home metformin while admitted. Monitor BG and order Low-dose insulin correction.   · Continued home atorvastatin      Asymptomatic bacteruria  · Given ceftriaxone in ER. WBC 2 in UA. Will not continue antibiotic.       Diet: Diet Adult Regular (IDDSI Level 7)  DVT Prophylaxis: Holding in setting of GIB. TEDs.   Anticoagulants   Medication Route Frequency       L/D/A:  PIV x2    Discharge plan and follow up  Home or Self Care      Provider  Alva Dutta MD  [facility] [place] MED D   Department of Hospital Medicine    Scribe Attestation: I personally scribed for Alva Dutta MD on 01/07/2019 at 10:39 AM. Electronically signed by kris Weeks on 01/07/2019 at 10:39 AM.

## 2019-01-07 NOTE — PLAN OF CARE
[facility] [address] [facility] [place] - [address] AT [facility] OF [facility] [address] [facility]  [address]  [place] [address]  Phone: [phone] Fax: [phone]    [facility] - [place], [place] - [address]  [address] [address]  Phone: [phone] Fax: [phone]    This CM spoke with patient daughter at bedside to conduct assessment.       01/07/19 1156   Discharge Assessment   Assessment Type Discharge Planning Assessment   Confirmed/corrected address and phone number on facesheet? Yes   Assessment information obtained from? Caregiver   Expected Length of Stay (days) 2   Communicated expected length of stay with patient/caregiver no   Prior to hospitilization cognitive status: Alert/Oriented   Prior to hospitalization functional status: Independent   Current cognitive status: Unable to Assess  (asleep)   Current Functional Status: Needs Assistance   Facility Arrived From: (brought in by patient daughter)   Lives With alone   Able to Return to Prior Arrangements yes   Is patient able to care for self after discharge? Unable to determine at this time (comments)   Who are your caregiver(s) and their phone number(s)? (daughter More Julien [phone])   Patient's perception of discharge disposition home or selfcare   Readmission Within the Last 30 Days no previous admission in last 30 days   Patient currently being followed by outpatient case management? No   Patient currently receives any other outside agency services? No   Equipment Currently Used at Home none   Do you have any problems affording any of your prescribed medications? No   Is the patient taking medications as prescribed? yes   Does the patient have transportation home? Yes   Transportation Anticipated family or friend will provide   Does the patient receive services at the [facility] Clinic? No   Discharge Plan A Home with family   Discharge Plan B Home    Patient/Family in Agreement with Plan yes     Minerva Henry RN/BSN/CM  [facility]  [phone]  Ortho/[facility]

## 2019-01-07 NOTE — PLAN OF CARE
Problem: Adult Inpatient Plan of Care  Goal: Plan of Care Review  Patient is alert, responsive, oriented x 4. Patient slept well overnight, no complaints voiced. VSS. Patient remains on 1 liter of O2 per nasal cannula. O2 sats drop to lower 80's on RA. No changes made in treatment plan.

## 2019-01-07 NOTE — PROGRESS NOTES
Physician Attestation for Scribe:  I, Alva Dutta MD, personally performed the services described in this documentation. All medical record entries made by the scribe were at my direction and in my presence.  I have reviewed the chart and agree that the record reflects my personal performance and is accurate and complete.   Alva Dutta MD    Hospital Medicine  Progress note    Patient Name: Sachin Birch  MRN: [id number]  [place] [facility] Alva Dutta MD  Admit Date: 1/5/2019  Code status: Full Code    Principal Problem:  Gastrointestinal hemorrhage    Interval hx:  Reports feeling well. No new complaints.     ROS   Respiratory: no cough or shortness of breath  Cardiovascular: no chest pain or palpitations  Gastrointestinal: no nausea or vomiting, no abdominal pain or change in bowel habits  Behavioral/Psych: no depression or anxiety      PEx  Temp:  [97.9 °F (36.6 °C)-99.2 °F (37.3 °C)]   Pulse:  [65-67]   Resp:  [14-18]   BP: (139-167)/(62-94)   SpO2:  [88 %-95 %]     Intake/Output Summary (Last 24 hours) at 1/6/2019 2025  Last data filed at 1/6/2019 1500  Gross per 24 hour   Intake 1218.75 ml   Output --   Net 1218.75 ml       General Appearance: no acute distress   Heart: regular rate and rhythm  Respiratory: Normal respiratory effort, no crackles   Abdomen: Soft, non-tender; bowel sounds active  Skin: intact.  Neurologic:  No focal numbness or weakness  Mental status: Alert, oriented x 4, affect appropriate     Recent Labs   Lab 01/05/19  0813 01/05/19  1814 01/06/19  0417 01/06/19  1810   WBC 8.24  --  6.24  --    HGB 8.9* 8.4* 7.7* 7.4*   HCT 27.4* 26.6* 25.2* 24.3*     --  168  --      Recent Labs   Lab 01/05/19  0706      K 4.5      CO2 27   BUN 30   CREATININE 1.0   *   CALCIUM 9.8     Recent Labs   Lab 01/05/19  0706   ALKPHOS 46*   ALT 9*   AST 21   ALBUMIN 3.6   PROT 7.2   BILITOT 0.4   INR 1.2      Recent Labs   Lab 01/05/19  2230 01/06/19  0901 01/06/19  1445  01/06/19  1828   POCTGLUCOSE 134* 113* 101 230*       Scheduled Meds:   amLODIPine  10 mg Oral Daily    ascorbic acid (vitamin C)  250 mg Oral QHS    atorvastatin  40 mg Oral Daily    benazepril  40 mg Oral QAM    carvedilol  25 mg Oral BID WM    ferrous sulfate  325 mg Oral QHS    pantoprazole  40 mg Intravenous BID     Continuous Infusions:  As Needed:  acetaminophen, dextrose 50%, dextrose 50%, glucagon (human recombinant), glucose, glucose, insulin aspart U-100, sodium chloride 0.9%    Active Hospital Problems    Diagnosis  POA    *Gastrointestinal hemorrhage [K92.2]  Yes    Acute blood loss anemia [D62]  Yes    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       Overview  89 y.o. female with history HTN, HLD, Afib,& GI bleed with PUD who presents for blood in her stool. Colonoscopy in 2015 noted multiple diverticula and EGD noted erythematous mucosa with a papule detected in the gastric body.       Assessment and Plan for Problems addressed today:     Gastrointestinal hemorrhage  Iron deficiency anemia   Acute blood anemia  · Colonoscopy in 2015 noted multiple diverticula & EGD in 2015 noted erythematous mucosa with a papule detected in the gastric body.  · Hemodynamically stable. CXR with no acute abnormalities.   · Probably diverticular. Ordered IV pantoprazole BID. Trend Hgb and ordering iron studies. C. Diff & stool studies ordered. CL diet as tolerated. Continue maintenance IVF for now.  · Iron studies consistent of iron deficiency anemia with low saturated iron and ferritin. Ferrous sulfate 325 mg QHS and vitamin C 250 mg QHS - consider IV iron infusions   · Hgb 8.4 > 7.7; Monitoring. No further BMs. Advancing diet  to regular as tolerated. (1/6)      Hx of Atrial fibrillation   Hypertension  · Not on any chronic anticoagulation currently as atrial fibrillation was in setting of upper GI bleed and has not recurred since  · Continued home benazepril, amlodipine & carvedilol      Type 2 Diabetes mellitus with HLD  DM II with HTN and CKD II  · A1c (1/5): 6%  · Holding home metformin while admitted. Monitor BG and order Low-dose insulin correction.   · Continued home atorvastatin      Asymptomatic bacteruria  · Given ceftriaxone in ER. WBC 2 in UA. Will not continue antibiotic.       Diet: Diet Adult Regular (IDDSI Level 7)  DVT Prophylaxis: Holding in setting of GIB. TEDs.   Anticoagulants   Medication Route Frequency       L/D/A:  PIV x2    Discharge plan and follow up  Home or Self Care      Provider  Alva Dutta MD  [facility] MED D   Department of Hospital Medicine    Scribe Attestation: I personally scribed for Alva Dutta MD on 01/06/2019 at 10:39 AM. Electronically signed by kris Weeks on 01/06/2019 at 10:39 AM.

## 2019-01-08 ENCOUNTER — TELEPHONE (OUTPATIENT)
Dept: INTERNAL MEDICINE | Facility: CLINIC | Age: 84
End: 2019-01-08

## 2019-01-08 VITALS
TEMPERATURE: 98 F | SYSTOLIC BLOOD PRESSURE: 151 MMHG | HEART RATE: 58 BPM | WEIGHT: 120.5 LBS | HEIGHT: 61 IN | BODY MASS INDEX: 22.75 KG/M2 | DIASTOLIC BLOOD PRESSURE: 67 MMHG | OXYGEN SATURATION: 89 % | RESPIRATION RATE: 19 BRPM

## 2019-01-08 PROBLEM — G47.34 SLEEP RELATED HYPOXIA: Status: ACTIVE | Noted: 2019-01-08

## 2019-01-08 PROBLEM — K92.2 GASTROINTESTINAL HEMORRHAGE: Status: RESOLVED | Noted: 2019-01-05 | Resolved: 2019-01-08

## 2019-01-08 LAB
ALBUMIN SERPL BCP-MCNC: 3 G/DL
ANION GAP SERPL CALC-SCNC: 6 MMOL/L
BASOPHILS # BLD AUTO: 0.04 K/UL
BASOPHILS NFR BLD: 0.5 %
BUN SERPL-MCNC: 8 MG/DL
CALCIUM SERPL-MCNC: 8.6 MG/DL
CHLORIDE SERPL-SCNC: 106 MMOL/L
CO2 SERPL-SCNC: 28 MMOL/L
CREAT SERPL-MCNC: 0.7 MG/DL
DIFFERENTIAL METHOD: ABNORMAL
EOSINOPHIL # BLD AUTO: 0.3 K/UL
EOSINOPHIL NFR BLD: 3.9 %
ERYTHROCYTE [DISTWIDTH] IN BLOOD BY AUTOMATED COUNT: 13.6 %
EST. GFR  (AFRICAN AMERICAN): >60 ML/MIN/1.73 M^2
EST. GFR  (NON AFRICAN AMERICAN): >60 ML/MIN/1.73 M^2
GLUCOSE SERPL-MCNC: 110 MG/DL
HCT VFR BLD AUTO: 30.2 %
HGB BLD-MCNC: 9.3 G/DL
IMM GRANULOCYTES # BLD AUTO: 0.03 K/UL
IMM GRANULOCYTES NFR BLD AUTO: 0.4 %
LYMPHOCYTES # BLD AUTO: 1.1 K/UL
LYMPHOCYTES NFR BLD: 15.1 %
MCH RBC QN AUTO: 28.8 PG
MCHC RBC AUTO-ENTMCNC: 30.8 G/DL
MCV RBC AUTO: 94 FL
MONOCYTES # BLD AUTO: 0.8 K/UL
MONOCYTES NFR BLD: 10.4 %
NEUTROPHILS # BLD AUTO: 5.2 K/UL
NEUTROPHILS NFR BLD: 69.7 %
NRBC BLD-RTO: 0 /100 WBC
PHOSPHATE SERPL-MCNC: 3 MG/DL
PLATELET # BLD AUTO: 180 K/UL
PMV BLD AUTO: 10.4 FL
POCT GLUCOSE: 105 MG/DL (ref 70–110)
POTASSIUM SERPL-SCNC: 3.7 MMOL/L
RBC # BLD AUTO: 3.23 M/UL
SODIUM SERPL-SCNC: 140 MMOL/L
WBC # BLD AUTO: 7.43 K/UL

## 2019-01-08 PROCEDURE — 63600175 PHARM REV CODE 636 W HCPCS: Mod: HCNC | Performed by: INTERNAL MEDICINE

## 2019-01-08 PROCEDURE — 99217 PR OBSERVATION CARE DISCHARGE: CPT | Mod: HCNC,,, | Performed by: INTERNAL MEDICINE

## 2019-01-08 PROCEDURE — C9113 INJ PANTOPRAZOLE SODIUM, VIA: HCPCS | Mod: HCNC | Performed by: INTERNAL MEDICINE

## 2019-01-08 PROCEDURE — 99217 PR OBSERVATION CARE DISCHARGE: ICD-10-PCS | Mod: HCNC,,, | Performed by: INTERNAL MEDICINE

## 2019-01-08 PROCEDURE — 25000003 PHARM REV CODE 250: Mod: HCNC | Performed by: INTERNAL MEDICINE

## 2019-01-08 PROCEDURE — 82962 GLUCOSE BLOOD TEST: CPT | Mod: HCNC

## 2019-01-08 PROCEDURE — 36415 COLL VENOUS BLD VENIPUNCTURE: CPT | Mod: HCNC

## 2019-01-08 PROCEDURE — 85025 COMPLETE CBC W/AUTO DIFF WBC: CPT | Mod: HCNC

## 2019-01-08 PROCEDURE — G0378 HOSPITAL OBSERVATION PER HR: HCPCS | Mod: HCNC

## 2019-01-08 PROCEDURE — 80069 RENAL FUNCTION PANEL: CPT | Mod: HCNC

## 2019-01-08 RX ORDER — ASCORBIC ACID 250 MG
250 TABLET ORAL NIGHTLY
COMMUNITY
Start: 2019-01-08

## 2019-01-08 RX ORDER — METFORMIN HYDROCHLORIDE 500 MG/1
500 TABLET ORAL
Qty: 90 TABLET | Refills: 3 | Status: SHIPPED | OUTPATIENT
Start: 2019-01-08 | End: 2019-01-01 | Stop reason: SDUPTHER

## 2019-01-08 RX ORDER — FERROUS SULFATE 325(65) MG
325 TABLET, DELAYED RELEASE (ENTERIC COATED) ORAL NIGHTLY
Refills: 0 | COMMUNITY
Start: 2019-01-08 | End: 2019-01-01 | Stop reason: ALTCHOICE

## 2019-01-08 RX ADMIN — CARVEDILOL 25 MG: 25 TABLET, FILM COATED ORAL at 09:01

## 2019-01-08 RX ADMIN — PANTOPRAZOLE SODIUM 40 MG: 40 INJECTION, POWDER, FOR SOLUTION INTRAVENOUS at 09:01

## 2019-01-08 RX ADMIN — SODIUM CHLORIDE 125 MG: 9 INJECTION, SOLUTION INTRAVENOUS at 09:01

## 2019-01-08 RX ADMIN — BENAZEPRIL HYDROCHLORIDE 40 MG: 20 TABLET, FILM COATED ORAL at 06:01

## 2019-01-08 RX ADMIN — ATORVASTATIN CALCIUM 40 MG: 20 TABLET, FILM COATED ORAL at 09:01

## 2019-01-08 RX ADMIN — AMLODIPINE BESYLATE 10 MG: 10 TABLET ORAL at 09:01

## 2019-01-08 NOTE — PLAN OF CARE
Problem: Adult Inpatient Plan of Care  Goal: Plan of Care Review  Outcome: Ongoing (interventions implemented as appropriate)  Pt with no falls or injuries this shift. Pt afebrile, pt with no s/s infection.

## 2019-01-08 NOTE — NURSING
Home Oxygen Evaluation    Date Performed: 2019    1) Patient's Home O2 Sat on room air, while at rest: 88        If O2 sats on room air at rest are 88% or below, patient qualifies. No additional testing needed. Document N/A in steps 2 and 3. If 89% or above, complete steps 2.      2) Patient's O2 Sat on room air while exercisin-93        If O2 sats on room air while exercising remain 89% or above patient does not qualify, no further testing needed Document N/A in step 3. If O2 sats on room air while exercising are 88% or below, continue to step 3.      3) Patient's O2 Sat while exercising on O2: at LPM         (Must show improvement from #2 for patients to qualify)    If O2 sats improve on oxygen, patient qualifies for portable oxygen. If not, the patient does not qualify.

## 2019-01-08 NOTE — MEDICAL/APP STUDENT
[facility] Medicine  Progress Note    Patient Name: Sachin Birch  MRN: [id number]  Team: [facility] MED D Stacia Schneider MD   Admit Date: 1/5/2019  MARY 1/7/2019  Code status: Full Code    Principal Problem:  Gastrointestinal hemorrhage    Interval history:      Review of Systems   Constitutional: Negative for fever.   Respiratory: Negative for shortness of breath.        Physical Exam:  Temp:  [96.1 °F (35.6 °C)-98.6 °F (37 °C)]   Pulse:  [58-66]   Resp:  [15-19]   BP: (151-177)/(67-74)   SpO2:  [84 %-97 %]      Temp: 98.1 °F (36.7 °C) (01/08/19 1144)  Pulse: (!) 58 (01/08/19 1144)  Resp: 19 (01/08/19 1144)  BP: (!) 151/67 (01/08/19 1144)  SpO2: (!) 87 % (01/08/19 1144)    Intake/Output Summary (Last 24 hours) at 1/8/2019 1213  Last data filed at 1/7/2019 1449  Gross per 24 hour   Intake 350 ml   Output --   Net 350 ml     Weight: 54.6 kg (120 lb 7.7 oz)  Body mass index is 22.76 kg/m².    Physical Exam   Constitutional: No distress.   Eyes: Conjunctivae and lids are normal.   Cardiovascular: S1 normal and S2 normal.   Pulmonary/Chest: Effort normal and breath sounds normal.   Abdominal: Soft. Bowel sounds are normal. There is no tenderness.   Musculoskeletal: She exhibits no edema.       Significant Labs:  Recent Labs   Lab 01/05/19  0813 01/05/19  1814 01/06/19  0417 01/06/19  1810 01/08/19  0535   WBC 8.24  --  6.24  --  7.43   HGB 8.9* 8.4* 7.7* 7.4* 9.3*   HCT 27.4* 26.6* 25.2* 24.3* 30.2*     --  168  --  180     Recent Labs   Lab 01/05/19  0706 01/08/19  0535    140   K 4.5 3.7    106   CO2 27 28   BUN 30 8*   CREATININE 1.0 0.7   * 110   CALCIUM 9.8 8.6*   PHOS  --  3.0   ALKPHOS 46*  --    ALT 9*  --    AST 21  --    ALBUMIN 3.6 3.0*   PROT 7.2  --    BILITOT 0.4  --    INR 1.2  --      Recent Labs   Lab 01/06/19  1828 01/06/19 2024 01/07/19 0819 01/07/19 1815 01/07/19 2018 01/08/19  0835   POCTGLUCOSE 230* 163* 131* 130* 182* 105     A1C:   Recent Labs   Lab 01/05/19  6130    HGBA1C 6.0*     Inpatient Medications prescribed for management of current Problems:   Scheduled Meds:    amLODIPine  10 mg Oral Daily    ascorbic acid (vitamin C)  250 mg Oral QHS    atorvastatin  40 mg Oral Daily    benazepril  40 mg Oral QAM    carvedilol  25 mg Oral BID WM    ferric gluconate (FERRLECIT) IVPB  125 mg Intravenous Daily    ferrous sulfate  325 mg Oral QHS    pantoprazole  40 mg Intravenous BID     Continuous Infusions:   As Needed: sodium chloride, acetaminophen, dextrose 50%, dextrose 50%, glucagon (human recombinant), glucose, glucose, insulin aspart U-100, sodium chloride 0.9%    Active Hospital Problems    Diagnosis  POA    *Gastrointestinal hemorrhage [K92.2]  Yes    Acute blood loss anemia [D62]  Yes    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       Overview:    89 y.o. female with history HTN, HLD, Afib,& GI bleed with PUD who presents for blood in her stool. Colonoscopy in 2015 noted multiple diverticula and EGD noted erythematous mucosa with a papule detected in the gastric body. IV pantoprazole initiated. Patient's iron studies were consistent with iron deficiency anemia; iron and vitamin c supplementation started. Despite no further BMs and no evidence of hematemesis, Hgb further trended down and she received IV iron & 1U pRBCs with appropriate response.       Assessment and Plan for Problems addressed today:    Gastrointestinal hemorrhage  Iron deficiency anemia   Acute blood loss anemia  Acute hypoxic respiratory failure  · Colonoscopy in 2015 noted multiple diverticula & EGD in 2015 noted erythematous mucosa with a papule detected in the gastric body.  · Hemodynamically stable. CXR  with no acute abnormalities.   · Probably diverticular. Ordered IV pantoprazole BID. Trend Hgb and ordering iron studies. C. Diff & stool studies ordered. CL diet as tolerated. Continue maintenance IVF for now.  · Iron studies consistent of iron deficiency anemia with low saturated iron and ferritin. Ferrous sulfate 325 mg QHS and vitamin C 250 mg QHS - consider IV iron infusions   · Hgb 8.4 > 7.7; Monitoring. No further BMs. Advancing diet to regular as tolerated. (1/6)   · Patient hypoxic at 88-90% on RA. Ordering CXR. Hgb 7.4; ordering 1 U pRBC and IV ferric gluconate. (1/7)  · Patient remains slightly hypoxic on RA; patient denies any symptoms of dyspnea. Per chart review, patient's baseline appears to be 89-92%. CXR was unchanged. (1/8)     Hx of Atrial fibrillation   Hypertension  · Not on any chronic anticoagulation currently as atrial fibrillation was in setting of upper GI bleed and has not recurred since.  · Continued home benazepril, amlodipine & carvedilol      Type 2 Diabetes mellitus with HLD  DM II with HTN and CKD II  · A1c (1/5): 6%  · Holding home metformin while admitted. Monitor BG and order Low-dose insulin correction.   · Continued home atorvastatin      Asymptomatic bacteruria  · Given ceftriaxone in ER. WBC 2 in UA. Will not continue antibiotic.     Diet: Diet Adult Regular (IDDSI Level 7)    DVT Prophylaxis: Held in setting of GIB. TEDs/SCDs.   Anticoagulants   Medication Route Frequency       L/D/A:  PIV x2    Discharge plan and follow up  Home or Self Care      Provider  Stacia Schneider MD  [facility] [place] [facility] MED D   Department of Hospital Medicine    Scribe Attestation: I personally scribed for Stacia Schneider MD on 01/08/2019 at 12:13 PM. Electronically signed by kris Weeks on 01/08/2019 at 12:13 PM.

## 2019-01-08 NOTE — NURSING
Order to d/c home today. Saline lock removed. VSS. Discharge instructions given to pt and daughter. Pt and daughter verbalized understanding. Pt discharged from OBS via w/c. Pt accompanied by transport associate and daughter. All personal belongings taken home by pt.

## 2019-01-08 NOTE — PLAN OF CARE
Problem: Adult Inpatient Plan of Care  Goal: Plan of Care Review  Outcome: Ongoing (interventions implemented as appropriate)  Pt with no falls or injuries this shift. Pt amb with steady gait. Pt afebrile, no s/s infection. Pt with no s/s hypo or hyperglycemia.

## 2019-01-08 NOTE — DISCHARGE SUMMARY
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 female with HTN, HLD, A-fib, & GI bleed with PUD who presents for blood in her stool. She noted bleeding in her stool 1 week ago (Saturday), but up until then she has been in her usual fair state of health. She reports losing blood for 1 day, starting last night. She noted clots in her stool and blood in the toilet. Family members noted her losing consciousness for about 30 seconds, but no subsequent fall. She has been having some weakness lately. She has associated nausea and vomited a small amount of food yesterday x1 and today this morning in ER. She has not been taking any NSAIDS or anticoagulation. No fevers, chills, sick contacts, dizziness, or chest pain. Patient had a colonoscopy 4 years ago showing extensive diverticulosis."     Hospital Course:  Patient with HTN, HLD, A-fib & GI bleed with PUD and extensive diverticulosis admitted for blood in her stool. Colonoscopy in 2015 noted multiple diverticula and EGD noted erythematous mucosa with a papule detected in the gastric body. IV pantoprazole was initiated. Patient's iron studies were consistent with iron deficiency anemia; iron and vitamin C supplementation " started. Despite no further BMs and no evidence of hematemesis, Hgb trended downward and she received IV iron & 1 Unit pRBCs with appropriate response.     Lower Gastrointestinal hemorrhage  Iron deficiency anemia   Acute blood loss anemia    · Colonoscopy in 2015 noted multiple diverticula & EGD in 2015 noted erythematous mucosa with a papule detected in the gastric body.  · Hemodynamically stable. CXR with no acute abnormalities.   · Probably diverticular etiology for bleeding. Ordered IV pantoprazole BID. Trended Hgb and ordered iron studies. C. Diff & stool studies ordered. Clear liquid diet as tolerated. Continued maintenance IVF for now.  · Iron studies consistent of iron deficiency anemia with low saturated iron and ferritin. Ferrous sulfate 325 mg QHS and vitamin C 250 mg QHS   · Hgb 8.4 > 7.7; Monitoring. No further BMs. Advancing diet to regular as tolerated. (1/6)   · Hgb 7.4; transfused 1 U pRBC and IV ferric gluconate. (1/7)  · Bleeding resolved; follow up in GI Clinic.    Sleep-related Hypoxia  · Patient hypoxic at 88-90% on RA. Ordered CXR. (1/7)  · Patient remains slightly hypoxic on RA while sleeping, SpO2 87 - 88% but is 90 - 92% with ambulation; patient denies any symptoms of dyspnea. Per chart review, patient's baseline appears to be 89-92%. CXR was unchanged. (1/8)  · Plan for follow up in Sleep Clinic; consider nocturnal home O2.     Hx of Atrial fibrillation   Hypertension  · Not on chronic anticoagulation currently as atrial fibrillation was in setting of upper GI bleed and has not recurred since.  · Continued home benazepril, amlodipine & carvedilol      Type 2 Diabetes mellitus with HLD  DM II with HTN and CKD II  · A1c (1/5): 6%  · Held home metformin while admitted. Monitor BG and order Low-dose insulin correction.   · Continued home atorvastatin      Asymptomatic bacteruria  · Received ceftriaxone in ER. WBC 2 in UA. Not continued.   ·     Recent Labs   Lab 01/05/19  0813  01/06/19  7484  01/06/19  1810 01/08/19  0535   WBC 8.24  --  6.24  --  7.43   HGB 8.9*   < > 7.7* 7.4* 9.3*   HCT 27.4*   < > 25.2* 24.3* 30.2*     --  168  --  180    < > = values in this interval not displayed.     Recent Labs   Lab 01/05/19  0706 01/08/19  0535    140   K 4.5 3.7    106   CO2 27 28   BUN 30 8*   CREATININE 1.0 0.7   * 110   CALCIUM 9.8 8.6*   PHOS  --  3.0     Recent Labs   Lab 01/05/19  0706 01/08/19  0535   ALKPHOS 46*  --    ALT 9*  --    AST 21  --    ALBUMIN 3.6 3.0*   PROT 7.2  --    BILITOT 0.4  --    INR 1.2  --       Recent Labs   Lab 01/06/19 1828 01/06/19 2024 01/07/19 0819 01/07/19 1815 01/07/19 2018 01/08/19  0835   POCTGLUCOSE 230* 163* 131* 130* 182* 105       Procedures: none    Consultants: none    Medications:    Current Discharge Medication List      START taking these medications    Details   ascorbic acid, vitamin C, (VITAMIN C) 250 MG tablet Take 1 tablet (250 mg total) by mouth every evening.      ferrous sulfate 325 (65 FE) MG EC tablet Take 1 tablet (325 mg total) by mouth every evening.  Refills: 0         CONTINUE these medications which have CHANGED    Details   metFORMIN (GLUCOPHAGE) 500 MG tablet Take 1 tablet (500 mg total) by mouth daily with breakfast.  Qty: 90 tablet, Refills: 3         CONTINUE these medications which have NOT CHANGED    Details   amLODIPine (NORVASC) 10 MG tablet TAKE 1 TABLET ONE TIME DAILY  Qty: 90 tablet, Refills: 3    Associated Diagnoses: Essential hypertension      atorvastatin (LIPITOR) 40 MG tablet TAKE 1 TABLET EVERY DAY  Qty: 90 tablet, Refills: 3      benazepril (LOTENSIN) 40 MG tablet Take 1 tablet (40 mg total) by mouth every morning.  Qty: 90 tablet, Refills: 3      carvedilol (COREG) 25 MG tablet Take 1 tablet (25 mg total) by mouth 2 (two) times daily with meals.  Qty: 180 tablet, Refills: 3      pantoprazole (PROTONIX) 40 MG tablet TAKE 1 TABLET EVERY DAY  Qty: 90 tablet, Refills: 3    Associated Diagnoses:  Peptic ulcer disease      CALCIUM CARBONATE/VITAMIN D3 (CALCIUM 500 + D, D3, ORAL) Take 1 tablet by mouth Daily.          STOP taking these medications       aspirin 81 MG Chew Comments:   Reason for Stopping:         cephALEXin (KEFLEX) 500 MG capsule Comments:   Reason for Stopping:         ciprofloxacin HCl (CIPRO) 500 MG tablet Comments:   Reason for Stopping:         ciprofloxacin-dexamethasone 0.3-0.1% (CIPRODEX) 0.3-0.1 % DrpS Comments:   Reason for Stopping:               Discharge Instructions:  Discharge Procedure Orders   Ambulatory referral to Gastroenterology   Referral Priority: Routine Referral Type: Consultation   Referral Reason: Specialty Services Required   Requested Specialty: Gastroenterology   Number of Visits Requested: 1     Ambulatory consult to Sleep Disorders   Referral Priority: Routine Referral Type: Consultation   Requested Specialty: Sleep Medicine   Number of Visits Requested: 1     Diet Cardiac   Order Comments: Diabetic [address]     Notify your health care provider if you experience any of the following:  temperature >100.4     Notify your health care provider if you experience any of the following:  persistent nausea and vomiting or diarrhea     Notify your health care provider if you experience any of the following:  difficulty breathing or increased cough     Notify your health care provider if you experience any of the following:  persistent dizziness, light-headedness, or visual disturbances     Notify your health care provider if you experience any of the following:  increased confusion or weakness     Activity as tolerated       Discharge Condition: stable    Disposition: Home or Self Care    Indwelling Lines/Drains at time of discharge: none    Tests pending at the time of discharge: none      Time spent on the discharge of the patient including review of hospital course with the patient, reviewing discharge medications and arranging follow-up care: 40 minutes.    Discharge  examination Patient was seen and examined on 1/8/2019 and determined to be suitable for discharge.    Discharge plan and follow up:  Follow-up Information     Jackson Anderson MD. Schedule an appointment as soon as possible for a visit in 2 weeks.    Specialty:  Gastroenterology  Why:  Clinic nurse to call with next available appointment.  Contact information:  [address]  [facility] [address]  [phone]             Kenneth Panda MD. Schedule an appointment as soon as possible for a visit in 1 week.    Specialty:  Internal Medicine  Why:  For discharge from hospital follow up  Contact information:  [address] [facility] [address]  [phone]             Liang Liu MD. Schedule an appointment as soon as possible for a visit on 6/3/2019.    Specialty:  Cardiology  Why:  As previously planned  Contact information:  [address] [facility]  8th Floor - Cardiology  [place] [address]  [phone]                 Future Appointments   Date Time Provider Department Center   1/14/2019 12:00 PM Kenneth Panda MD [facility]   2/25/2019  9:45 AM Maurisio Lopez III, MD [facility] [place]       Provider  Stacia Schneider MD  Department of Hospital Medicine  [facility]

## 2019-01-08 NOTE — PROGRESS NOTES
Physician Attestation for Scribe:  I, Alva Dutta MD, personally performed the services described in this documentation. All medical record entries made by the scribe were at my direction and in my presence.  I have reviewed the chart and agree that the record reflects my personal performance and is accurate and complete.   Alva Dutta MD    Hospital Medicine  Progress note    Patient Name: Sachin Birch  MRN: [id number]  Team: [facility] D Alva Dutta MD  Admit Date: 1/5/2019  Code status: Full Code    Principal Problem:  Gastrointestinal hemorrhage    Interval hx:  Patient with no new complaints.    ROS   Respiratory: no cough or shortness of breath  Cardiovascular: no chest pain or palpitations  Gastrointestinal: no nausea or vomiting, no abdominal pain or change in bowel habits  Behavioral/Psych: no depression or anxiety      PEx  Temp:  [97.2 °F (36.2 °C)-98.9 °F (37.2 °C)]   Pulse:  [64-68]   Resp:  [15-18]   BP: (150-177)/(67-74)   SpO2:  [88 %-97 %]     Intake/Output Summary (Last 24 hours) at 1/7/2019 2111  Last data filed at 1/7/2019 1449  Gross per 24 hour   Intake 590 ml   Output --   Net 590 ml       General Appearance: no acute distress   Heart: regular rate and rhythm  Respiratory: Normal respiratory effort, no crackles   Abdomen: Soft, non-tender; bowel sounds active  Skin: intact.  Neurologic:  No focal numbness or weakness  Mental status: Alert, oriented x 4, affect appropriate     Recent Labs   Lab 01/05/19  0813 01/05/19  1814 01/06/19  0417 01/06/19  1810   WBC 8.24  --  6.24  --    HGB 8.9* 8.4* 7.7* 7.4*   HCT 27.4* 26.6* 25.2* 24.3*     --  168  --      Recent Labs   Lab 01/05/19  0706      K 4.5      CO2 27   BUN 30   CREATININE 1.0   *   CALCIUM 9.8     Recent Labs   Lab 01/05/19  0706   ALKPHOS 46*   ALT 9*   AST 21   ALBUMIN 3.6   PROT 7.2   BILITOT 0.4   INR 1.2      Recent Labs   Lab 01/06/19  1448 01/06/19  1828 01/06/19 2024 01/07/19  0819  01/07/19  1815 01/07/19 2018   POCTGLUCOSE 101 230* 163* 131* 130* 182*       Scheduled Meds:   amLODIPine  10 mg Oral Daily    ascorbic acid (vitamin C)  250 mg Oral QHS    atorvastatin  40 mg Oral Daily    benazepril  40 mg Oral QAM    carvedilol  25 mg Oral BID WM    ferric gluconate (FERRLECIT) IVPB  125 mg Intravenous Daily    ferrous sulfate  325 mg Oral QHS    pantoprazole  40 mg Intravenous BID     Continuous Infusions:  As Needed:  sodium chloride, acetaminophen, dextrose 50%, dextrose 50%, glucagon (human recombinant), glucose, glucose, insulin aspart U-100, sodium chloride 0.9%    Active Hospital Problems    Diagnosis  POA    *Gastrointestinal hemorrhage [K92.2]  Yes    Acute blood loss anemia [D62]  Yes    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       Overview  89 y.o. female with history HTN, HLD, Afib,& GI bleed with PUD who presents for blood in her stool. Colonoscopy in 2015 noted multiple diverticula and EGD noted erythematous mucosa with a papule detected in the gastric body.       Assessment and Plan for Problems addressed today:     Gastrointestinal hemorrhage  Iron deficiency anemia   Acute blood anemia  Acute hypoxic respiratory failure  · Colonoscopy in 2015 noted multiple diverticula & EGD in 2015 noted erythematous mucosa with a papule detected in the gastric body.  · Hemodynamically stable. CXR with no acute abnormalities.   · Probably diverticular. Ordered IV pantoprazole BID. Trend Hgb and ordering iron studies. C. Diff & stool studies ordered. CL diet as tolerated. Continue maintenance IVF for now.  · Iron studies consistent of iron deficiency anemia with low saturated iron and ferritin.  Ferrous sulfate 325 mg QHS and vitamin C 250 mg QHS - consider IV iron infusions   · Hgb 8.4 > 7.7; Monitoring. No further BMs. Advancing diet to regular as tolerated. (1/6)   · Patient hypoxic at 88-90% on RA. Ordering CXR. Hgb 7.4; ordering 1 U pRBC and IV ferric gluconate. (1/7)     Hx of Atrial fibrillation   Hypertension  · Not on any chronic anticoagulation currently as atrial fibrillation was in setting of upper GI bleed and has not recurred since  · Continued home benazepril, amlodipine & carvedilol      Type 2 Diabetes mellitus with HLD  DM II with HTN and CKD II  · A1c (1/5): 6%  · Holding home metformin while admitted. Monitor BG and order Low-dose insulin correction.   · Continued home atorvastatin      Asymptomatic bacteruria  · Given ceftriaxone in ER. WBC 2 in UA. Will not continue antibiotic.       Diet: Diet Adult Regular (IDDSI Level 7)  DVT Prophylaxis: Holding in setting of GIB. TEDs.   Anticoagulants   Medication Route Frequency       L/D/A:  PIV x2    Discharge plan and follow up  Home or Self Care      Provider  Alva Dutta MD  [facility] MED D   Department of Hospital Medicine    Kris Attestation: I personally scribed for Alva Dutta MD on 01/07/2019 at 10:39 AM. Electronically signed by kris Weeks on 01/07/2019 at 10:39 AM.

## 2019-01-11 ENCOUNTER — TELEPHONE (OUTPATIENT)
Dept: INTERNAL MEDICINE | Facility: CLINIC | Age: 84
End: 2019-01-11

## 2019-01-14 ENCOUNTER — OFFICE VISIT (OUTPATIENT)
Dept: INTERNAL MEDICINE | Facility: CLINIC | Age: 84
End: 2019-01-14
Payer: MEDICARE

## 2019-01-14 ENCOUNTER — LAB VISIT (OUTPATIENT)
Dept: LAB | Facility: HOSPITAL | Age: 84
End: 2019-01-14
Attending: INTERNAL MEDICINE
Payer: MEDICARE

## 2019-01-14 ENCOUNTER — TELEPHONE (OUTPATIENT)
Dept: ENDOSCOPY | Facility: HOSPITAL | Age: 84
End: 2019-01-14

## 2019-01-14 VITALS
HEIGHT: 61 IN | WEIGHT: 114.63 LBS | HEART RATE: 60 BPM | DIASTOLIC BLOOD PRESSURE: 54 MMHG | TEMPERATURE: 98 F | SYSTOLIC BLOOD PRESSURE: 108 MMHG | BODY MASS INDEX: 21.64 KG/M2

## 2019-01-14 DIAGNOSIS — D50.9 IRON DEFICIENCY ANEMIA, UNSPECIFIED IRON DEFICIENCY ANEMIA TYPE: ICD-10-CM

## 2019-01-14 DIAGNOSIS — E78.5 HYPERLIPIDEMIA ASSOCIATED WITH TYPE 2 DIABETES MELLITUS: ICD-10-CM

## 2019-01-14 DIAGNOSIS — I35.1 NONRHEUMATIC AORTIC VALVE INSUFFICIENCY: ICD-10-CM

## 2019-01-14 DIAGNOSIS — I10 ESSENTIAL HYPERTENSION: ICD-10-CM

## 2019-01-14 DIAGNOSIS — D62 ACUTE BLOOD LOSS ANEMIA: ICD-10-CM

## 2019-01-14 DIAGNOSIS — K27.9 PUD (PEPTIC ULCER DISEASE): ICD-10-CM

## 2019-01-14 DIAGNOSIS — I70.0 ATHEROSCLEROSIS OF AORTA: ICD-10-CM

## 2019-01-14 DIAGNOSIS — I25.10 CORONARY ARTERY DISEASE INVOLVING NATIVE CORONARY ARTERY OF NATIVE HEART WITHOUT ANGINA PECTORIS: ICD-10-CM

## 2019-01-14 DIAGNOSIS — K62.5 RECTAL BLEEDING: Primary | ICD-10-CM

## 2019-01-14 DIAGNOSIS — E11.9 TYPE 2 DIABETES MELLITUS WITHOUT COMPLICATION, WITHOUT LONG-TERM CURRENT USE OF INSULIN: ICD-10-CM

## 2019-01-14 DIAGNOSIS — E11.69 HYPERLIPIDEMIA ASSOCIATED WITH TYPE 2 DIABETES MELLITUS: ICD-10-CM

## 2019-01-14 DIAGNOSIS — I48.20 CHRONIC ATRIAL FIBRILLATION: ICD-10-CM

## 2019-01-14 DIAGNOSIS — K62.5 RECTAL BLEEDING: ICD-10-CM

## 2019-01-14 LAB
ANION GAP SERPL CALC-SCNC: 8 MMOL/L
BASOPHILS # BLD AUTO: 0.07 K/UL
BASOPHILS NFR BLD: 1 %
BUN SERPL-MCNC: 19 MG/DL
CALCIUM SERPL-MCNC: 10.2 MG/DL
CHLORIDE SERPL-SCNC: 100 MMOL/L
CO2 SERPL-SCNC: 32 MMOL/L
CREAT SERPL-MCNC: 0.9 MG/DL
DIFFERENTIAL METHOD: ABNORMAL
EOSINOPHIL # BLD AUTO: 0.2 K/UL
EOSINOPHIL NFR BLD: 3.1 %
ERYTHROCYTE [DISTWIDTH] IN BLOOD BY AUTOMATED COUNT: 14.4 %
EST. GFR  (AFRICAN AMERICAN): >60 ML/MIN/1.73 M^2
EST. GFR  (NON AFRICAN AMERICAN): 55.7 ML/MIN/1.73 M^2
ESTIMATED AVG GLUCOSE: 120 MG/DL
GLUCOSE SERPL-MCNC: 106 MG/DL
HBA1C MFR BLD HPLC: 5.8 %
HCT VFR BLD AUTO: 35.6 %
HGB BLD-MCNC: 10.9 G/DL
IMM GRANULOCYTES # BLD AUTO: 0.02 K/UL
IMM GRANULOCYTES NFR BLD AUTO: 0.3 %
IRON SERPL-MCNC: 105 UG/DL
LYMPHOCYTES # BLD AUTO: 1.8 K/UL
LYMPHOCYTES NFR BLD: 26.2 %
MCH RBC QN AUTO: 30.1 PG
MCHC RBC AUTO-ENTMCNC: 30.6 G/DL
MCV RBC AUTO: 98 FL
MONOCYTES # BLD AUTO: 0.6 K/UL
MONOCYTES NFR BLD: 9.1 %
NEUTROPHILS # BLD AUTO: 4 K/UL
NEUTROPHILS NFR BLD: 60.3 %
NRBC BLD-RTO: 0 /100 WBC
PLATELET # BLD AUTO: 260 K/UL
PMV BLD AUTO: 9.9 FL
POTASSIUM SERPL-SCNC: 4.5 MMOL/L
RBC # BLD AUTO: 3.62 M/UL
SATURATED IRON: 28 %
SODIUM SERPL-SCNC: 140 MMOL/L
TOTAL IRON BINDING CAPACITY: 377 UG/DL
TRANSFERRIN SERPL-MCNC: 255 MG/DL
WBC # BLD AUTO: 6.71 K/UL

## 2019-01-14 PROCEDURE — 1101F PT FALLS ASSESS-DOCD LE1/YR: CPT | Mod: CPTII,HCNC,S$GLB, | Performed by: INTERNAL MEDICINE

## 2019-01-14 PROCEDURE — 85025 COMPLETE CBC W/AUTO DIFF WBC: CPT | Mod: HCNC

## 2019-01-14 PROCEDURE — 80048 BASIC METABOLIC PNL TOTAL CA: CPT | Mod: HCNC

## 2019-01-14 PROCEDURE — 99215 PR OFFICE/OUTPT VISIT, EST, LEVL V, 40-54 MIN: ICD-10-PCS | Mod: HCNC,S$GLB,, | Performed by: INTERNAL MEDICINE

## 2019-01-14 PROCEDURE — 1101F PR PT FALLS ASSESS DOC 0-1 FALLS W/OUT INJ PAST YR: ICD-10-PCS | Mod: CPTII,HCNC,S$GLB, | Performed by: INTERNAL MEDICINE

## 2019-01-14 PROCEDURE — 83036 HEMOGLOBIN GLYCOSYLATED A1C: CPT | Mod: HCNC

## 2019-01-14 PROCEDURE — 99215 OFFICE O/P EST HI 40 MIN: CPT | Mod: HCNC,S$GLB,, | Performed by: INTERNAL MEDICINE

## 2019-01-14 PROCEDURE — 36415 COLL VENOUS BLD VENIPUNCTURE: CPT | Mod: HCNC,PO

## 2019-01-14 PROCEDURE — 99999 PR PBB SHADOW E&M-EST. PATIENT-LVL III: ICD-10-PCS | Mod: PBBFAC,HCNC,, | Performed by: INTERNAL MEDICINE

## 2019-01-14 PROCEDURE — 83540 ASSAY OF IRON: CPT | Mod: HCNC

## 2019-01-14 PROCEDURE — 99999 PR PBB SHADOW E&M-EST. PATIENT-LVL III: CPT | Mod: PBBFAC,HCNC,, | Performed by: INTERNAL MEDICINE

## 2019-01-14 NOTE — PROGRESS NOTES
Subjective:       Patient ID: Sachin Birch is a 89 y.o. female.    Chief Complaint: hospital f/u  HISTORY OF PRESENT ILLNESS:  A 89-year-old  female well known to me, who   was admitted one week ago following two bloody bowel movements.  With that, she   was syncopal.  She was in the hospital for four days.  She had no colonoscopy,   but got 1 unit of blood and two infusions of iron.  She had been on aspirin   before that and was told to discontinue it.  The patient two weeks prior to that   had a one-day history of slight bleeding with her bowel movement.  She feels   well at home.  She feels back to normal.  She did not get confused or have a   change in her disposition with her hospitalization.    PHYSICAL EXAMINATION:  VITAL SIGNS:  Normal including a pulse of 60.  SKIN:  Slightly pale but she always seems to appear that way.  There is no skin   bruising or hemorrhage.  CHEST:  Clear.  CARDIAC:  Rhythm regular.  ABDOMEN:  Nontender.  Not distended.  Bowel sounds are active.  She has no   fullness or mass.    IMPRESSION:  Rectal bleeding, probably diverticular.  She had been seen by the   GI Service, but there was a note from her hospitalization.  She was to follow up   with them, so that was arranged for her.  I did lab on her, which included a   BMP, normal.  Iron level is good.  CBC shows hematocrit of 36.  Hemoglobin A1c   5.8.    PLAN:  I will see her again at regularly scheduled time and the rest of the   followup with GI.      ERNESTO/BREE  dd: 01/23/2019 23:40:13 (CST)  td: 01/24/2019 18:35:13 (CST)  Doc ID   #[id number]  Job ID #[id number]    CC:     Dict [id number]  HPI  Review of Systems   Constitutional: Negative.    HENT: Negative for congestion, hearing loss, sinus pressure, sneezing, sore throat and voice change.    Eyes: Negative for visual disturbance.   Respiratory: Negative for cough, chest tightness and shortness of breath.    Cardiovascular: Negative.  Negative for chest pain, palpitations and leg  swelling.   Gastrointestinal: Positive for anal bleeding.   Genitourinary: Negative for difficulty urinating, dysuria, flank pain, frequency, hematuria, menstrual problem, pelvic pain, urgency, vaginal bleeding and vaginal discharge.   Musculoskeletal: Negative.  Negative for neck pain and neck stiffness.   Skin: Negative.    Neurological: Positive for syncope. Negative for dizziness, seizures, light-headedness, numbness and headaches.   Psychiatric/Behavioral: Negative for agitation, behavioral problems, confusion and sleep disturbance. The patient is not nervous/anxious.        Objective:      Physical Exam   Constitutional: She is oriented to person, place, and time. She appears well-developed and well-nourished.   Eyes: EOM are normal. Pupils are equal, round, and reactive to light.   Neck: Normal range of motion. Neck supple. No JVD present. No thyromegaly present.   Cardiovascular: Normal rate, regular rhythm, normal heart sounds and intact distal pulses. Exam reveals no gallop.   No murmur heard.  Pulmonary/Chest: Breath sounds normal. She has no wheezes. She has no rales.   Abdominal: Soft. Bowel sounds are normal. She exhibits no mass. There is no tenderness.   Musculoskeletal: Normal range of motion.   Lymphadenopathy:     She has no cervical adenopathy.   Neurological: She is alert and oriented to person, place, and time. She has normal reflexes. No cranial nerve deficit.   Skin: No rash noted. No erythema.   Psychiatric: She has a normal mood and affect. Judgment normal.       Assessment:       1. Rectal bleeding    2. Essential hypertension    3. Coronary artery disease involving native coronary artery of native heart without angina pectoris    4. Type 2 diabetes mellitus without complication, without long-term current use of insulin    5. Atherosclerosis of aorta    6. Nonrheumatic aortic valve insufficiency    7. Acute blood loss anemia    8. Iron deficiency anemia, unspecified iron deficiency anemia  type    9. PUD (peptic ulcer disease)    10. Chronic atrial fibrillation    11. Hyperlipidemia associated with type 2 diabetes mellitus        Plan:

## 2019-01-14 NOTE — TELEPHONE ENCOUNTER
----- Message from Nithya Hammer sent at 1/14/2019  2:03 PM CST -----  Contact: [phone] Daughter  Dr Kenneth Panda has entered a referral for patient to be seen by Gastro ASAP. Patient was admitted to hospital on 1/5 for rectal bleeding.  Patient was discharged on 1/8.  Patient was seen today by her PCP for hospital f/u.  Patient had previously been seen by Dr Anderson for the same issue. The hospital Doctors recommended patient follow up with him because of patient history with him. But will see any available for soonest appointment.  Please call patient's daughter More to schedule ASAP or message me back to advise Dr Panda.    Rectal bleeding [K62.5]  Acute blood loss anemia [D62]  Iron deficiency anemia, unspecified iron deficiency anemia type [D50.9]    Thanks, Maritza  7th Fl Referrals  [facility]

## 2019-01-15 ENCOUNTER — TELEPHONE (OUTPATIENT)
Dept: GASTROENTEROLOGY | Facility: CLINIC | Age: 84
End: 2019-01-15

## 2019-01-15 ENCOUNTER — TELEPHONE (OUTPATIENT)
Dept: INTERNAL MEDICINE | Facility: CLINIC | Age: 84
End: 2019-01-15

## 2019-01-15 NOTE — TELEPHONE ENCOUNTER
----- Message from Love Tom sent at 1/15/2019  3:46 PM CST -----  Contact: Daughter- More- [phone]  Monica- pts daughter returning a missed call from Aditi- please contact More at [phone]

## 2019-01-15 NOTE — TELEPHONE ENCOUNTER
----- Message from Kenneth Panda MD sent at 1/15/2019  2:49 PM CST -----  Her blood count is better and diabetes is good

## 2019-01-17 ENCOUNTER — OFFICE VISIT (OUTPATIENT)
Dept: GASTROENTEROLOGY | Facility: CLINIC | Age: 84
End: 2019-01-17
Payer: MEDICARE

## 2019-01-17 VITALS
DIASTOLIC BLOOD PRESSURE: 66 MMHG | HEIGHT: 61 IN | HEART RATE: 66 BPM | SYSTOLIC BLOOD PRESSURE: 141 MMHG | WEIGHT: 121.31 LBS | BODY MASS INDEX: 22.91 KG/M2

## 2019-01-17 DIAGNOSIS — I10 ESSENTIAL HYPERTENSION: ICD-10-CM

## 2019-01-17 DIAGNOSIS — D64.9 ANEMIA, UNSPECIFIED TYPE: ICD-10-CM

## 2019-01-17 DIAGNOSIS — K57.31 DIVERTICULOSIS OF LARGE INTESTINE WITH HEMORRHAGE: ICD-10-CM

## 2019-01-17 DIAGNOSIS — I25.10 CORONARY ARTERY DISEASE INVOLVING NATIVE CORONARY ARTERY OF NATIVE HEART WITHOUT ANGINA PECTORIS: ICD-10-CM

## 2019-01-17 DIAGNOSIS — K62.5 RECTAL BLEEDING: Primary | ICD-10-CM

## 2019-01-17 DIAGNOSIS — I48.20 CHRONIC ATRIAL FIBRILLATION: ICD-10-CM

## 2019-01-17 PROCEDURE — 99999 PR PBB SHADOW E&M-EST. PATIENT-LVL IV: CPT | Mod: PBBFAC,HCNC,, | Performed by: PHYSICIAN ASSISTANT

## 2019-01-17 PROCEDURE — 1101F PR PT FALLS ASSESS DOC 0-1 FALLS W/OUT INJ PAST YR: ICD-10-PCS | Mod: CPTII,HCNC,S$GLB, | Performed by: PHYSICIAN ASSISTANT

## 2019-01-17 PROCEDURE — 99999 PR PBB SHADOW E&M-EST. PATIENT-LVL IV: ICD-10-PCS | Mod: PBBFAC,HCNC,, | Performed by: PHYSICIAN ASSISTANT

## 2019-01-17 PROCEDURE — 99203 OFFICE O/P NEW LOW 30 MIN: CPT | Mod: HCNC,S$GLB,, | Performed by: PHYSICIAN ASSISTANT

## 2019-01-17 PROCEDURE — 99203 PR OFFICE/OUTPT VISIT, NEW, LEVL III, 30-44 MIN: ICD-10-PCS | Mod: HCNC,S$GLB,, | Performed by: PHYSICIAN ASSISTANT

## 2019-01-17 PROCEDURE — 1101F PT FALLS ASSESS-DOCD LE1/YR: CPT | Mod: CPTII,HCNC,S$GLB, | Performed by: PHYSICIAN ASSISTANT

## 2019-01-17 NOTE — LETTER
January 18, 2019      Kenneth Panda MD  [address] [facility] [address]  [place] [address]           [facility] - Gastroenterology  [address]  [facility] [address]  Phone: [phone]  Fax: [phone]          Patient: Sachin Birch   MR Number: [id number]   YOB: 1926   Date of Visit: 1/17/2019       Dear Dr. Kenneth Panda:    Thank you for referring Sachin Birch to me for evaluation. Attached you will find relevant portions of my assessment and plan of care.    If you have questions, please do not hesitate to call me. I look forward to following Sachin Birch along with you.    Sincerely,    Holly Licona PA-C    Enclosure  CC:  No Recipients    If you would like to receive this communication electronically, please contact [email or web address] or [phone] to request more information on [employer] access.    For providers and/or their staff who would like to refer a patient to [facility], please contact us through our one-stop-shop provider referral line, [facility] , at [phone].    If you feel you have received this communication in error or would no longer like to receive these types of communications, please e-mail [email or web address]

## 2019-01-17 NOTE — PROGRESS NOTES
[facility] Consultation Note    Reason for Consult:  The primary encounter diagnosis was Rectal bleeding. Diagnoses of Diverticulosis of large intestine with hemorrhage, Anemia, unspecified type, Chronic atrial fibrillation, Coronary artery disease involving native coronary artery of native heart without angina pectoris, and Essential hypertension were also pertinent to this visit.    PCP:   Kenneth Panda   [address] [facility] / [place] [id number]    Referring MD:  Kenneth Panda Md  [address] [facility]  [place] [address]    HPI:  This is a 89 y.o. female here for evaluation of rectal bleeding  She is here today with her daughter who helped provide some of her history  PMH of CAD, A. Fib, ASA use     Has episode of BRB BM 12/29/18  Went to [place] 1/219 with daughter  On 1/4/19 had BRB BM in toilet x2, had a small fainting spell, BP normal when checked by daughter    Admitted at [facility]  Pt was hospitalized 1/5 for 3 days  Symptoms included: GI bleeding  Pertinent labs: Hgb 8.9, low iron sat 10, iron 34(wnl), TIBC wnl. Hx of HERNANDEZ 8 months prior, Ferritin 12  Discharged with Hg 9.3  Pertinent imaging: NA  Workup: she was transfused 1unit pRBC and given an iron infusion  GI was not consulted  Discharge meds: protonix 40mg  No overt bleeding during her admission    Today she denies rectal bleeding since discharge  denies straining with BMs at the time of bleeding  Eats some peanuts, but she says not very often  No rectal pain  Taking Iron 325 mg po every day  No NSAIDs  Was on Baby ASA for AFIB up until Jan 30, daughter around 1/1/19    Prior to this, she had noticed any blood in her stools since 4.5 yrs ago    Today denies hematemesis, N/V, abd pain,  GERD, melena, one episode of diarrhea  No SOB, chest pain, fatigue    Has been on protonix for the past 4.5 years that was prescibed to her by GI for hx ofGI bleeding. Denies ever having any reflux type symptoms and  has continued taking it because she was unsure whether to stop it.    Metformin x 6 mos    1/14/201 9labs:   Hgb-10.9  MCV- 98  Iron- 105  Iron Sat- 1028  TIBC- wnl     4/2015 EGD - Normal examined duodenum.                        - A single submucosal papule (nodule) found in the                         stomach. Biopsied.                        - Erythematous mucosa in the gastric body, antrum                         and prepyloric region of the stomach. Biopsied.                        - 1 cm hiatus hernia.    Colonoscopy- hemorrhoids, diverticuloisis    VCE8/ 2015 - One small alphonse of acid hematin in proximal small bowel. Very Small Flecks of blood recent bleeding in colon    Sanchez screening- Family Hx of:  Colon cancer- no  Uterine cancer-no  Small bowel cancer-no  Ureter cancer-no  Renal pelvis cancer-no    ROS:  Constitutional: No fevers, chills, No weight loss  ENT: No allergies  CV: No chest pain  Pulm: No cough, No shortness of breath  Ophtho: No vision changes  GI: see HPI  Derm: No rash  Heme: No lymphadenopathy, No bruising  MSK: No arthritis  : No dysuria, No hematuria  Endo: No hot or cold intolerance  Neuro: No syncope, No seizure  Psych: No anxiety, No depression    Medical History:  has a past medical history of Atrial fibrillation, Depression, Diverticulosis, GI bleed, HTN (hypertension), Hyperlipidemia, and UTI (urinary tract infection).    Surgical History:  has a past surgical history that includes Wrist surgery and Hysterectomy.    Family History: family history includes Diabetes in her brother and mother; Hyperlipidemia in her brother, daughter, and sister; Hypertension in her brother, sister, and son; No Known Problems in her father..     Social History:  reports that  has never smoked. she has never used smokeless tobacco. She reports that she does not drink alcohol or use drugs.    Review of patient's allergies indicates:  No Known Allergies    Current Outpatient Medications on File Prior  "to Visit   Medication Sig Dispense Refill    amLODIPine (NORVASC) 10 MG tablet TAKE 1 TABLET ONE TIME DAILY 90 tablet 3    ascorbic acid, vitamin C, (VITAMIN C) 250 MG tablet Take 1 tablet (250 mg total) by mouth every evening.      atorvastatin (LIPITOR) 40 MG tablet TAKE 1 TABLET EVERY DAY 90 tablet 3    benazepril (LOTENSIN) 40 MG tablet Take 1 tablet (40 mg total) by mouth every morning. 90 tablet 3    CALCIUM CARBONATE/VITAMIN D3 (CALCIUM 500 + D, D3, ORAL) Take 1 tablet by mouth Daily.       carvedilol (COREG) 25 MG tablet Take 1 tablet (25 mg total) by mouth 2 (two) times daily with meals. 180 tablet 3    ferrous sulfate 325 (65 FE) MG EC tablet Take 1 tablet (325 mg total) by mouth every evening.  0    metFORMIN (GLUCOPHAGE) 500 MG tablet Take 1 tablet (500 mg total) by mouth daily with breakfast. 90 tablet 3    pantoprazole (PROTONIX) 40 MG tablet TAKE 1 TABLET EVERY DAY 90 tablet 3     No current facility-administered medications on file prior to visit.          Objective Findings:    Vital Signs:  BP (!) 141/66   Pulse 66   Ht 5' 1" (1.549 m)   Wt 55 kg (121 lb 4.8 oz)   BMI 22.92 kg/m²   Body mass index is 22.92 kg/m².    Physical Exam:  General Appearance: Well appearing in no acute distress  Head:   Normocephalic, without obvious abnormality  Eyes:    No scleral icterus  ENT: Neck supple, Lips, mucosa, and tongue normal  Lungs: CTA bilaterally in anterior and posterior fields, no wheezes, no crackles.  Heart:  Regular rate and rhythm, S1, S2 normal, no murmurs heard  Abdomen: Soft, non tender, non distended with positive bowel sounds in all four quadrants. No hepatosplenomegaly, ascites, or mass  Extremities: no edema  Skin: No rash  Neurologic: AAO x 3      Labs:  Lab Results   Component Value Date    WBC 6.71 01/14/2019    HGB 10.9 (L) 01/14/2019    HCT 35.6 (L) 01/14/2019     01/14/2019    CHOL 183 05/22/2018    TRIG 240 (H) 05/22/2018    HDL 44 05/22/2018    ALT 9 (L) 01/05/2019 "    AST 21 01/05/2019     01/14/2019    K 4.5 01/14/2019     01/14/2019    CREATININE 0.9 01/14/2019    BUN 19 01/14/2019    CO2 32 (H) 01/14/2019    TSH 0.580 05/22/2018    INR 1.2 01/05/2019    HGBA1C 5.8 (H) 01/14/2019       Imaging:    Endoscopy:    4/2015 EGD - Normal examined duodenum.                        - A single submucosal papule (nodule) found in the                         stomach. Biopsied.                        - Erythematous mucosa in the gastric body, antrum                         and prepyloric region of the stomach. Biopsied.                        - 1 cm hiatus hernia.    Colonoscopy- hemorrhoids, diverticuloisis    VCE8/ 2015 - One small alphonse of acid hematin in proximal small bowel. Very Small Flecks of blood recent bleeding in colon    Assessment:  1. Rectal bleeding    2. Diverticulosis of large intestine with hemorrhage    3. Anemia, unspecified type    4. Chronic atrial fibrillation    5. Coronary artery disease involving native coronary artery of native heart without angina pectoris    6. Essential hypertension       89yo F with Hx of GI bleeding presents after being hospitalized 1.5 weeks ago for 3 episodes of passing a large amount of blood in the stool. She received 1 unit pRBC and IV iron. Hgb and iron levels improved. No further bleeding episodes. Her diverticulosis is extensive. Bleeding diverticular vs hemorrhoidal vs AVM    Recommendations:  1. I discussed the benefits and risk of EGD /colonoscopy with the patient and her daughter to evaluate the patient's rectal bleeding and HERNANDEZ. They elected to hold off on endoscopy at this time and  trend the H/H every 2 weeks x 4 weeks.     2. Will have her f/u with cardiology to update them on her anemia and discontinuing aspirin. This is if Endoscopy is performed cardiac clearance can be made quickly.     3. Continue protonix    No Follow-up on file.      Order summary:  Orders Placed This Encounter    CBC auto differential     CBC auto differential    Ambulatory Referral to Cardiology         Thank you so much for allowing me to participate in the care of Sachin Birch    Holly Licona PA-C

## 2019-01-28 ENCOUNTER — LAB VISIT (OUTPATIENT)
Dept: LAB | Facility: HOSPITAL | Age: 84
End: 2019-01-28
Attending: PHYSICIAN ASSISTANT
Payer: MEDICARE

## 2019-01-28 DIAGNOSIS — K62.5 RECTAL BLEEDING: ICD-10-CM

## 2019-01-28 DIAGNOSIS — D64.9 ANEMIA, UNSPECIFIED TYPE: ICD-10-CM

## 2019-01-28 LAB
BASOPHILS # BLD AUTO: 0.07 K/UL
BASOPHILS NFR BLD: 1.4 %
DIFFERENTIAL METHOD: ABNORMAL
EOSINOPHIL # BLD AUTO: 0.2 K/UL
EOSINOPHIL NFR BLD: 4.7 %
ERYTHROCYTE [DISTWIDTH] IN BLOOD BY AUTOMATED COUNT: 13.8 %
HCT VFR BLD AUTO: 38.5 %
HGB BLD-MCNC: 11.8 G/DL
IMM GRANULOCYTES # BLD AUTO: 0.01 K/UL
IMM GRANULOCYTES NFR BLD AUTO: 0.2 %
LYMPHOCYTES # BLD AUTO: 1.6 K/UL
LYMPHOCYTES NFR BLD: 30.6 %
MCH RBC QN AUTO: 29.9 PG
MCHC RBC AUTO-ENTMCNC: 30.6 G/DL
MCV RBC AUTO: 98 FL
MONOCYTES # BLD AUTO: 0.5 K/UL
MONOCYTES NFR BLD: 9.8 %
NEUTROPHILS # BLD AUTO: 2.7 K/UL
NEUTROPHILS NFR BLD: 53.3 %
NRBC BLD-RTO: 0 /100 WBC
PLATELET # BLD AUTO: 223 K/UL
PMV BLD AUTO: 10.5 FL
RBC # BLD AUTO: 3.94 M/UL
WBC # BLD AUTO: 5.09 K/UL

## 2019-01-28 PROCEDURE — 36415 COLL VENOUS BLD VENIPUNCTURE: CPT | Mod: HCNC,PO

## 2019-01-28 PROCEDURE — 85025 COMPLETE CBC W/AUTO DIFF WBC: CPT | Mod: HCNC

## 2019-01-29 ENCOUNTER — TELEPHONE (OUTPATIENT)
Dept: GASTROENTEROLOGY | Facility: CLINIC | Age: 84
End: 2019-01-29

## 2019-01-29 NOTE — TELEPHONE ENCOUNTER
Attempted to pt's daughter and pt regarding pt results neither of them answered left vm instructing both of them to call back.

## 2019-02-11 ENCOUNTER — LAB VISIT (OUTPATIENT)
Dept: LAB | Facility: HOSPITAL | Age: 84
End: 2019-02-11
Attending: INTERNAL MEDICINE
Payer: MEDICARE

## 2019-02-11 DIAGNOSIS — D64.9 ANEMIA, UNSPECIFIED TYPE: ICD-10-CM

## 2019-02-11 DIAGNOSIS — K62.5 RECTAL BLEEDING: ICD-10-CM

## 2019-02-11 LAB
BASOPHILS # BLD AUTO: 0.06 K/UL
BASOPHILS NFR BLD: 1.2 %
DIFFERENTIAL METHOD: ABNORMAL
EOSINOPHIL # BLD AUTO: 0.2 K/UL
EOSINOPHIL NFR BLD: 2.9 %
ERYTHROCYTE [DISTWIDTH] IN BLOOD BY AUTOMATED COUNT: 12.7 %
HCT VFR BLD AUTO: 37.7 %
HGB BLD-MCNC: 12 G/DL
IMM GRANULOCYTES # BLD AUTO: 0.02 K/UL
IMM GRANULOCYTES NFR BLD AUTO: 0.4 %
LYMPHOCYTES # BLD AUTO: 1.3 K/UL
LYMPHOCYTES NFR BLD: 25.8 %
MCH RBC QN AUTO: 31.4 PG
MCHC RBC AUTO-ENTMCNC: 31.8 G/DL
MCV RBC AUTO: 99 FL
MONOCYTES # BLD AUTO: 0.5 K/UL
MONOCYTES NFR BLD: 9.4 %
NEUTROPHILS # BLD AUTO: 3.1 K/UL
NEUTROPHILS NFR BLD: 60.3 %
NRBC BLD-RTO: 0 /100 WBC
PLATELET # BLD AUTO: 219 K/UL
PMV BLD AUTO: 10.6 FL
RBC # BLD AUTO: 3.82 M/UL
WBC # BLD AUTO: 5.2 K/UL

## 2019-02-11 PROCEDURE — 36415 COLL VENOUS BLD VENIPUNCTURE: CPT | Mod: HCNC,PO

## 2019-02-11 PROCEDURE — 85025 COMPLETE CBC W/AUTO DIFF WBC: CPT | Mod: HCNC

## 2019-02-18 DIAGNOSIS — K27.9 PEPTIC ULCER DISEASE: ICD-10-CM

## 2019-02-18 DIAGNOSIS — I10 ESSENTIAL HYPERTENSION: ICD-10-CM

## 2019-02-18 RX ORDER — BENAZEPRIL HYDROCHLORIDE 40 MG/1
40 TABLET ORAL EVERY MORNING
Qty: 90 TABLET | Refills: 3 | Status: SHIPPED | OUTPATIENT
Start: 2019-02-18 | End: 2019-01-01 | Stop reason: ALTCHOICE

## 2019-02-18 RX ORDER — PANTOPRAZOLE SODIUM 40 MG/1
40 TABLET, DELAYED RELEASE ORAL DAILY
Qty: 90 TABLET | Refills: 3 | Status: SHIPPED | OUTPATIENT
Start: 2019-02-18 | End: 2019-01-01 | Stop reason: SDUPTHER

## 2019-02-18 RX ORDER — CARVEDILOL 25 MG/1
25 TABLET ORAL 2 TIMES DAILY WITH MEALS
Qty: 180 TABLET | Refills: 3 | Status: SHIPPED | OUTPATIENT
Start: 2019-02-18 | End: 2019-01-01 | Stop reason: SDUPTHER

## 2019-02-18 RX ORDER — AMLODIPINE BESYLATE 10 MG/1
TABLET ORAL
Qty: 90 TABLET | Refills: 3 | Status: SHIPPED | OUTPATIENT
Start: 2019-02-18 | End: 2019-01-01 | Stop reason: SDUPTHER

## 2019-02-18 NOTE — TELEPHONE ENCOUNTER
----- Message from Keisha S Delfin sent at 2/18/2019  9:28 AM CST -----  Contact: Daughter More Briones# [phone]  RX request - refill or new RX.  Is this a refill or new RX:  Refill  RX name and strength: pantoprazole (PROTONIX) 40 MG tablet  carvedilol (COREG) 25 MG tablet  benazepril (LOTENSIN) 40 MG tablet  amLODIPine (NORVASC) 10 MG tablet  Directions:   Is this a 30 day or 90 day RX:  90  Local pharmacy or mail order pharmacy:  [facility] - [facility] [address]  Pharmacy name and phone #[facility],Phone# [phone] ,Fax# [phone]

## 2019-02-19 ENCOUNTER — PES CALL (OUTPATIENT)
Dept: ADMINISTRATIVE | Facility: CLINIC | Age: 84
End: 2019-02-19

## 2019-02-19 ENCOUNTER — TELEPHONE (OUTPATIENT)
Dept: GASTROENTEROLOGY | Facility: CLINIC | Age: 84
End: 2019-02-19

## 2019-02-19 NOTE — TELEPHONE ENCOUNTER
"Attempted to call pt regarding results and scheduling an appt with Cardiology pt didn't answer left vm instructing pt to call back.           ----- Message from Holly Licoan PA-C sent at 2/17/2019  7:51 AM CST -----  Please notify the pt that her blood count has improved since 2 weeks ago. Went from 11.8 to 12.0. Please schedule her appt with cardiology for "follow up, recent GI bleeding, stopped aspirin"    "

## 2019-03-12 ENCOUNTER — OFFICE VISIT (OUTPATIENT)
Dept: OTOLARYNGOLOGY | Facility: CLINIC | Age: 84
End: 2019-03-12
Payer: MEDICARE

## 2019-03-12 VITALS — HEART RATE: 65 BPM | SYSTOLIC BLOOD PRESSURE: 156 MMHG | DIASTOLIC BLOOD PRESSURE: 67 MMHG

## 2019-03-12 DIAGNOSIS — H60.41 CHOLESTEATOMA OF EXTERNAL AUDITORY CANAL, RIGHT: Primary | ICD-10-CM

## 2019-03-12 DIAGNOSIS — H90.A22 SENSORINEURAL HEARING LOSS (SNHL) OF LEFT EAR WITH RESTRICTED HEARING OF RIGHT EAR: ICD-10-CM

## 2019-03-12 DIAGNOSIS — H72.91 PERFORATION OF RIGHT TYMPANIC MEMBRANE: ICD-10-CM

## 2019-03-12 PROCEDURE — 99999 PR PBB SHADOW E&M-EST. PATIENT-LVL II: ICD-10-PCS | Mod: PBBFAC,HCNC,, | Performed by: OTOLARYNGOLOGY

## 2019-03-12 PROCEDURE — 69210 REMOVE IMPACTED EAR WAX UNI: CPT | Mod: HCNC,S$GLB,, | Performed by: OTOLARYNGOLOGY

## 2019-03-12 PROCEDURE — 99213 OFFICE O/P EST LOW 20 MIN: CPT | Mod: 25,HCNC,S$GLB, | Performed by: OTOLARYNGOLOGY

## 2019-03-12 PROCEDURE — 1101F PR PT FALLS ASSESS DOC 0-1 FALLS W/OUT INJ PAST YR: ICD-10-PCS | Mod: HCNC,CPTII,S$GLB, | Performed by: OTOLARYNGOLOGY

## 2019-03-12 PROCEDURE — 99213 PR OFFICE/OUTPT VISIT, EST, LEVL III, 20-29 MIN: ICD-10-PCS | Mod: 25,HCNC,S$GLB, | Performed by: OTOLARYNGOLOGY

## 2019-03-12 PROCEDURE — 69210 PR REMOVAL IMPACTED CERUMEN REQUIRING INSTRUMENTATION, UNILATERAL: ICD-10-PCS | Mod: HCNC,S$GLB,, | Performed by: OTOLARYNGOLOGY

## 2019-03-12 PROCEDURE — 99999 PR PBB SHADOW E&M-EST. PATIENT-LVL II: CPT | Mod: PBBFAC,HCNC,, | Performed by: OTOLARYNGOLOGY

## 2019-03-12 PROCEDURE — 1101F PT FALLS ASSESS-DOCD LE1/YR: CPT | Mod: HCNC,CPTII,S$GLB, | Performed by: OTOLARYNGOLOGY

## 2019-03-12 NOTE — PATIENT INSTRUCTIONS
AD canal cholesteatoma cavity  Cleaned  Some wax removed from AS eac  RTC 4 months for ear cleaning; repeat AS audiometry on return   Pt. is a candidate for amplification of the left ear ( per 7/ 2018 audiometry)

## 2019-03-12 NOTE — PROGRESS NOTES
CC:  Review of ear status  HPI:Ms. Birch is a spr 89-year-old obviously hard of hearing female who is accompanied by her daughter today.   Her daughter indicates her mother's reticence to consider amplification for her only hearing left ear.   Ms Birch completed an audiometric study in July last year which indicated her left ear multi frequency moderate to severe sensorineural hearing loss with 30 decibel left ear SRT score and normal tympanometry. Testing reliability was poor as the patient had to be reinstructed multiple times.  Based on that  information, she was considerd a candidate for amplification for the left ear.   She has essentially no hearing in the right ear.  She presents today in follow-up.    Past Medical History:   Diagnosis Date    Atrial fibrillation     Depression     Diverticulosis     GI bleed     HTN (hypertension)     Hyperlipidemia     UTI (urinary tract infection)      Current Outpatient Medications on File Prior to Visit   Medication Sig Dispense Refill    amLODIPine (NORVASC) 10 MG tablet TAKE 1 TABLET ONE TIME DAILY 90 tablet 3    ascorbic acid, vitamin C, (VITAMIN C) 250 MG tablet Take 1 tablet (250 mg total) by mouth every evening.      atorvastatin (LIPITOR) 40 MG tablet TAKE 1 TABLET EVERY DAY 90 tablet 3    benazepril (LOTENSIN) 40 MG tablet Take 1 tablet (40 mg total) by mouth every morning. 90 tablet 3    CALCIUM CARBONATE/VITAMIN D3 (CALCIUM 500 + D, D3, ORAL) Take 1 tablet by mouth Daily.       carvedilol (COREG) 25 MG tablet Take 1 tablet (25 mg total) by mouth 2 (two) times daily with meals. 180 tablet 3    ferrous sulfate 325 (65 FE) MG EC tablet Take 1 tablet (325 mg total) by mouth every evening.  0    metFORMIN (GLUCOPHAGE) 500 MG tablet Take 1 tablet (500 mg total) by mouth daily with breakfast. 90 tablet 3    pantoprazole (PROTONIX) 40 MG tablet Take 1 tablet (40 mg total) by mouth once daily. 90 tablet 3     No current facility-administered medications on  file prior to visit.      Past Surgical History:   Procedure Laterality Date    COLONOSCOPY N/A 4/29/2015    Performed by Jackson [facility] at [facility] ENDO (2ND FLR)    ECHOCARDIOGRAM-TRANSESOPHAGEAL N/A 8/2/2013    Performed by Tarsha Surgeon at [facility] TARSHA    EGD (ESOPHAGOGASTRODUODENOSCOPY) N/A 9/5/2013    Performed by Kenneth Payne MD at [facility] ENDO (4TH FLR)    EGD (ESOPHAGOGASTRODUODENOSCOPY) Left 8/2/2013    Performed by Kenneth Payne MD at [facility] ENDO (2ND FLR)    EGD (ESOPHAGOGASTRODUODENOSCOPY) Left 7/30/2013    Performed by Kenneth Payne MD at [facility] ENDO (2ND FLR)    ESOPHAGOGASTRODUODENOSCOPY (EGD) N/A 4/29/2015    Performed by Jackson Anderson MD at [facility] ENDO (2ND FLR)    HYSTERECTOMY      WRIST SURGERY       PE:  Blood pressure 156/67 pulse 65 height 5 ft 1 in weight 121 lb  General:  Alert and oriented lady in no acute distress  Both ears were examined under the microscope in the micro procedure room  Right ears examined.  Copious cerumen and keratin debris is removed from the right ear canal floor cavity. Some minor bleeding from a granulation tuft is noted after the cleaning procedure. The large right TM perforation is once again visualized.  There is no evidence of ethel otorrhea or otitis externa.  A firm dry cerumen impactions extracted from left ear canal along with some keratin debris from the floor.  Left eardrum is intact and clear as visualized in left middle ear space is well aerated.  Audiometry was not performed today.      DIAGNOSIS:     ICD-10-CM ICD-9-CM    1. Cholesteatoma of external auditory canal, right H60.41 380.21    2. Perforation of right tympanic membrane H72.91 384.20    3. Sensorineural hearing loss (SNHL) of left ear with restricted hearing of right ear H90.A22 389.22      PLAN: AD canal cholesteatoma cavity  Cleaned  Some wax removed from AS eac  RTC 4 months for ear cleaning; repeat AS audiometry on return   Pt. is a candidate for amplification of the left ear (  per 7/ 2018 audiometry)

## 2019-03-31 PROBLEM — K57.91 DIVERTICULOSIS OF INTESTINE WITH BLEEDING: Status: ACTIVE | Noted: 2019-01-01

## 2019-03-31 NOTE — PROGRESS NOTES
Subjective:   Patient ID:  Sachin Birch is a 89 y.o. female who presents for follow-up of CAD/CM.    HPI:  The patient is here for CAD/HF/AF-stopped ASA with GI bleeding.    The patient has no chest pain, SOB, TIA, palpitations, syncope or pre-syncope.Patient does not exercise.She sees Dinshaw at [place] but wants to go back. BPs at [facility] occ 140-150s but mostly 125--130.She's been off ASA well over 1 year well before recent bleeding.        Review of Systems   Constitution: Negative for chills, decreased appetite, diaphoresis, fever, malaise/fatigue, night sweats, weight gain and weight loss.   HENT: Negative for congestion, hoarse voice, nosebleeds, sore throat and tinnitus.    Eyes: Negative for blurred vision, double vision, vision loss in left eye, vision loss in right eye, visual disturbance and visual halos.   Cardiovascular: Negative for chest pain, claudication, cyanosis, dyspnea on exertion, irregular heartbeat, leg swelling, near-syncope, orthopnea, palpitations, paroxysmal nocturnal dyspnea and syncope.   Respiratory: Negative for cough, hemoptysis, shortness of breath, sleep disturbances due to breathing, snoring, sputum production and wheezing.    Endocrine: Negative for cold intolerance, heat intolerance, polydipsia, polyphagia and polyuria.   Hematologic/Lymphatic: Negative for adenopathy and bleeding problem. Does not bruise/bleed easily.   Skin: Negative for color change, dry skin, flushing, itching, nail changes, poor wound healing, rash, skin cancer, suspicious lesions and unusual hair distribution.   Musculoskeletal: Negative for arthritis, back pain, falls, gout, joint pain, joint swelling, muscle cramps, muscle weakness, myalgias and stiffness.   Gastrointestinal: Positive for hematochezia. Negative for abdominal pain, anorexia, change in bowel habit, constipation, diarrhea, dysphagia, heartburn, hematemesis, melena and vomiting.   Genitourinary: Negative for decreased libido, dysuria,  hematuria, hesitancy and urgency.   Neurological: Negative for excessive daytime sleepiness, dizziness, focal weakness, headaches, light-headedness, loss of balance, numbness, paresthesias, seizures, sensory change, tremors, vertigo and weakness.   Psychiatric/Behavioral: Negative for altered mental status, depression, hallucinations, memory loss, substance abuse and suicidal ideas. The patient does not have insomnia and is not nervous/anxious.    Allergic/Immunologic: Negative for environmental allergies and hives.       Objective: There were no vitals taken for this visit.     Physical Exam   Constitutional: She is oriented to person, place, and time. She appears well-developed and well-nourished.   HENT:   Head: Normocephalic.   Eyes: Pupils are equal, round, and reactive to light. EOM are normal.   Neck: Normal range of motion. Normal carotid pulses, no hepatojugular reflux and no JVD present. Carotid bruit is not present. No thyromegaly present.   Cardiovascular: Normal rate, regular rhythm, normal heart sounds and intact distal pulses. Exam reveals no gallop and no friction rub.   No murmur heard.  Pulmonary/Chest: Effort normal and breath sounds normal. No tachypnea. No respiratory distress. She has no wheezes. She has no rales. She exhibits no tenderness.   Abdominal: Soft. Bowel sounds are normal. She exhibits no distension and no mass. There is no tenderness. There is no rebound and no guarding.   Musculoskeletal: Normal range of motion. She exhibits no edema or tenderness.   Lymphadenopathy:     She has no cervical adenopathy.   Neurological: She is alert and oriented to person, place, and time. No cranial nerve deficit. Coordination normal.   Skin: Skin is warm. No rash noted. No erythema.   Psychiatric: She has a normal mood and affect. Her behavior is normal. Judgment and thought content normal.       Assessment:     1. Coronary artery disease involving native coronary artery of native heart without  angina pectoris    2. Dilated cardiomyopathy    3. Atrial flutter, unspecified type    4. Atherosclerosis of aorta    5. Chronic atrial fibrillation    6. Essential hypertension    7. Hyperlipidemia associated with type 2 diabetes mellitus    8. Iron deficiency anemia due to chronic blood loss    9. Nonrheumatic aortic valve insufficiency    10. PUD (peptic ulcer disease)    11. Rectal bleeding    12. Recurrent UTI    13. Type 2 diabetes mellitus with hypercholesterolemia    14. Type 2 diabetes mellitus with stage 2 chronic kidney disease and hypertension    15. Acute blood loss anemia    16. Acute cystitis without hematuria    17. Diverticulosis of large intestine with hemorrhage        Plan:   Discussed diet , achieving and maintaining ideal body weight, and exercise.   We reviewed meds in detail.  Reassured-Discussed goals, options, plan.  Change benazepril to losartan 100 mg  No absolute need for baby ASA and in future if needed ,could try just 3-4 times per week but no need to re-start if she has been off for some time  Follow-up with either me or Noe hinesn-no need to set a follow-up, although at some time in future , a CFD could be repeated ( I did not even appreciate murmur today!)      Diagnoses and all orders for this visit:    Coronary artery disease involving native coronary artery of native heart without angina pectoris    Dilated cardiomyopathy    Atrial flutter, unspecified type    Atherosclerosis of aorta    Chronic atrial fibrillation    Essential hypertension    Hyperlipidemia associated with type 2 diabetes mellitus    Iron deficiency anemia due to chronic blood loss    Nonrheumatic aortic valve insufficiency    PUD (peptic ulcer disease)    Rectal bleeding    Recurrent UTI    Type 2 diabetes mellitus with hypercholesterolemia    Type 2 diabetes mellitus with stage 2 chronic kidney disease and hypertension    Acute blood loss anemia    Acute cystitis without hematuria    Diverticulosis of large  intestine with hemorrhage            They will follow with Noe or me in future prn pt/daughter or Primary. At some point in future , a CFD could be re-done.

## 2019-04-01 NOTE — LETTER
April 1, 2019      Holly Licona PA-C  [address]  [facility] [address]           [facility]  [address]  [facility] [address]  Phone: [phone]          Patient: Sachin Birch   MR Number: [id number]   YOB: 1926   Date of Visit: 4/1/2019       Dear Holly Licona:    Thank you for referring Sachin Birch to me for evaluation. Attached you will find relevant portions of my assessment and plan of care.    If you have questions, please do not hesitate to call me. I look forward to following Sachin Birch along with you.    Sincerely,    Som Zelaya MD    Enclosure  CC:  No Recipients    If you would like to receive this communication electronically, please contact [email or web address] or [phone] to request more information on [employer] access.    For providers and/or their staff who would like to refer a patient to [facility], please contact us through our one-stop-shop provider referral line, [facility], at [phone].    If you feel you have received this communication in error or would no longer like to receive these types of communications, please e-mail [email or web address]

## 2019-04-01 NOTE — PATIENT INSTRUCTIONS
Discussed diet , achieving and maintaining ideal body weight, and exercise.   We reviewed meds in detail.  Reassured-Discussed goals, options, plan.  Change benazepril to losartan 100 mg  No absolute need for baby ASA and in future if needed ,could try just 3-4 times per week but no need to re-start if she has been off for some time  Follow-up with either me or Dinshaw prn-no need to set a follow-up, although at some time in future , a CFD could be repeated ( I did not even appreciate murmur today!)

## 2019-05-15 NOTE — TELEPHONE ENCOUNTER
----- Message from Aditi Granados sent at 5/15/2019  3:09 PM CDT -----  Contact: Daughter, Garth [phone]  Jacqueline patient daughter want to speak with you about one of her Rx that maybe expiring but doo not have the Rx name. Also want to discuss a handy cap sticker.

## 2019-05-16 NOTE — TELEPHONE ENCOUNTER
We set up Susan annual w/ labs.    Please ok refills that are either out or last time went to local instead of mail order.    Thanks loretta

## 2019-05-16 NOTE — TELEPHONE ENCOUNTER
----- Message from Aditi Granados sent at 5/15/2019  3:09 PM CDT -----  Contact: Daughter, Garth [phone]  Jacqueline patient daughter want to speak with you about one of her Rx that maybe expiring but doo not have the Rx name. Also want to discuss a handy cap sticker.

## 2019-07-16 NOTE — PROGRESS NOTES
Subjective:       Patient ID: Sachin Birch is a 89 y.o. female.    Chief Complaint: Annual Exam  HISTORY OF PRESENT ILLNESS:  A 89-year-old white female brought in by her   daughter for annual physical.  The patient is seeing Dr. Barajas who is an eye   doctor and has seen Dr. Lopez for her hearing.  She also sees cardiologist   Garcia and GUERRERO, but she does not recall the name of the doctor.  The patient had a   record review and was due to some vaccines, but the daughter and she did not   want those.  The patient had blood work done prior to the visit showing elevated   microalbumin of 86.  Urinalysis shows some proteinuria, some reactive changes.    Hemoglobin A1c 5.8.  Thyroid is normal.  CBC, lipids, chemistries are normal   with a glucose of 108.  I also ordered a urine culture on her, but that has not   been done.    PHYSICAL EXAMINATION:  VITAL SIGNS:  Normal.  GENERAL:  She actually looks extremely good for her age.  SKIN:  Shows some age-related changes, otherwise normal.  HEENT:  Clear except for a small amount of wax.  NECK:  Shows no adenopathy, thyroid enlargement, or bruit.  CHEST:  Clear.  HEART:  There is no murmur or gallop.  She has an occasional extra beat.  ABDOMEN:  Nontender without any organomegaly.  BREASTS:  Examined.  She has no mass to palpation.  No mammogram was done.  EXTREMITIES:  Show normal muscles, joints and pulses.  The feet were examined   and the exam is written down in detail.  NEUROLOGIC:  Otherwise, okay.  She had a prior stroke with excellent recovery.    IMPRESSION:  1.  Diabetes, well controlled.  2.  Generalized vascular disease, doing well.  3.  History of peptic ulcer disease with bleeding, but currently not giving her   trouble.  She should not be on any kind of anti-inflammatories including   aspirin.  4.  Anemia, corrected.  5.  Hypertension, controlled.  6.  Prior history of stroke with excellent recovery.  7.  Hearing decline.  8.  Abnormal urinalysis.  9.   Microalbuminemia associated with her diabetes.  I will see her again in six   months.      JDC/HN  dd: 08/01/2019 15:55:51 (CDT)  td: 08/02/2019 13:11:56 (CDT)  Doc ID   #[id number]  Job ID #[id number]    CC:     [facility] [id number]  [facility]  Review of Systems   Constitutional: Negative.    HENT: Negative for congestion, hearing loss, sinus pressure, sneezing, sore throat and voice change.    Eyes: Negative for visual disturbance.   Respiratory: Negative for cough, chest tightness and shortness of breath.    Cardiovascular: Negative.  Negative for chest pain, palpitations and leg swelling.   Gastrointestinal: Negative.    Genitourinary: Negative for difficulty urinating, dysuria, flank pain, frequency, hematuria, menstrual problem, pelvic pain, urgency, vaginal bleeding and vaginal discharge.   Musculoskeletal: Negative.  Negative for neck pain and neck stiffness.   Skin: Negative.    Neurological: Negative.  Negative for dizziness, seizures, light-headedness, numbness and headaches.   Psychiatric/Behavioral: Negative for agitation, behavioral problems, confusion and sleep disturbance. The patient is not nervous/anxious.        Objective:      Physical Exam   Constitutional: She is oriented to person, place, and time. She appears well-developed and well-nourished.   Eyes: Pupils are equal, round, and reactive to light. EOM are normal.   Neck: Normal range of motion. Neck supple. No JVD present. No thyromegaly present.   Cardiovascular: Normal rate, regular rhythm, normal heart sounds and intact distal pulses. Exam reveals no gallop.   No murmur heard.  Pulses:       Dorsalis pedis pulses are 3+ on the right side, and 3+ on the left side.        Posterior tibial pulses are 3+ on the right side, and 3+ on the left side.   Pulmonary/Chest: Breath sounds normal. She has no wheezes. She has no rales.   Abdominal: Soft. Bowel sounds are normal. She exhibits no mass. There is no tenderness.   Musculoskeletal: Normal range of motion.   Feet:    Right Foot:   Protective Sensation: 10 sites tested. 10 sites sensed.   Skin Integrity: Negative for ulcer, blister, skin breakdown, erythema, warmth, callus or dry skin.   Left Foot:   Protective Sensation: 10 sites tested. 10 sites sensed.   Skin Integrity: Negative for ulcer, blister, skin breakdown, erythema, warmth, callus or dry skin.   Lymphadenopathy:     She has no cervical adenopathy.   Neurological: She is alert and oriented to person, place, and time. She has normal reflexes. No cranial nerve deficit.   Skin: No rash noted. No erythema.   Psychiatric: She has a normal mood and affect. Judgment normal.       Assessment:       1. Peptic ulcer disease    2. Dilated cardiomyopathy    3. Essential hypertension    4. Nonrheumatic aortic valve insufficiency    5. Essential hypertension        Plan:

## 2019-08-01 NOTE — PROGRESS NOTES
Subjective:       Patient ID: Sachin Birch is a 89 y.o. female.    Chief Complaint: right shoulder pain (at 2-3 now,  hurts worse sometime.  has h/o bursitis same arm)    HPI   Pt c/o 5 days of intermittent right sided lateral shoulder pain described as a dull ache in nature made worse with movements. Minimal relief with Tylenol. No trauma to the area.   Review of Systems   Constitutional: Negative for activity change, appetite change, chills, diaphoresis, fatigue, fever and unexpected weight change.   HENT: Negative for postnasal drip, rhinorrhea, sinus pressure, sneezing, sore throat, trouble swallowing and voice change.    Respiratory: Negative for cough, shortness of breath and wheezing.    Cardiovascular: Negative for chest pain, palpitations and leg swelling.   Gastrointestinal: Negative for abdominal pain, blood in stool, constipation, diarrhea, nausea and vomiting.   Genitourinary: Negative for dysuria.   Musculoskeletal: Negative for arthralgias and myalgias.   Skin: Negative for rash and wound.   Allergic/Immunologic: Negative for environmental allergies and food allergies.   Hematological: Negative for adenopathy. Does not bruise/bleed easily.       Objective:      Physical Exam   Constitutional: She is oriented to person, place, and time. She appears well-developed and well-nourished. No distress.   HENT:   Head: Normocephalic and atraumatic.   Eyes: Pupils are equal, round, and reactive to light. Conjunctivae and EOM are normal. Right eye exhibits no discharge. Left eye exhibits no discharge. No scleral icterus.   Neck: Neck supple. No JVD present. No thyromegaly present.   Cardiovascular: Normal rate, regular rhythm, normal heart sounds and intact distal pulses.   No murmur heard.  Pulmonary/Chest: Effort normal and breath sounds normal. No respiratory distress. She has no wheezes. She has no rales. She exhibits no tenderness.   Abdominal: Soft. She exhibits no distension. There is no tenderness.  There is no guarding.   Musculoskeletal: She exhibits no edema.        Right shoulder: She exhibits tenderness and pain.   Lymphadenopathy:     She has no cervical adenopathy.   Neurological: She is alert and oriented to person, place, and time.   Skin: Skin is warm and dry. No rash noted. She is not diaphoretic. No pallor.   Psychiatric: She has a normal mood and affect. Judgment normal.   Nursing note and vitals reviewed.      Assessment:       1. Bursitis of right shoulder        Plan:    1. X-ray shoulder       S/p bursa injection       Rx Voltaren gel PRN      Procedure Note(bursa injection):   After the potential risk's and benefit's were discussed with pt and verbal consent was obtained, pt's right lateral shoulder area was prepped with alcohol. A total of 4 cc containing 3 cc Marcaine 0.5 % and 1 cc Kenalog 40 mg/cc was injected into right subdeltoid bursa. The pt tolerated the procedure well without any complications.

## 2020-01-01 ENCOUNTER — NURSE TRIAGE (OUTPATIENT)
Dept: ADMINISTRATIVE | Facility: CLINIC | Age: 85
End: 2020-01-01

## 2020-01-01 ENCOUNTER — TELEPHONE (OUTPATIENT)
Dept: INTERNAL MEDICINE | Facility: CLINIC | Age: 85
End: 2020-01-01

## 2020-01-01 ENCOUNTER — PES CALL (OUTPATIENT)
Dept: ADMINISTRATIVE | Facility: CLINIC | Age: 85
End: 2020-01-01

## 2020-01-01 ENCOUNTER — HOSPITAL ENCOUNTER (INPATIENT)
Facility: HOSPITAL | Age: 85
LOS: 4 days | DRG: 951 | End: 2020-03-26
Attending: EMERGENCY MEDICINE | Admitting: HOSPITALIST
Payer: MEDICARE

## 2020-01-01 VITALS
OXYGEN SATURATION: 79 % | HEIGHT: 60 IN | WEIGHT: 117.31 LBS | RESPIRATION RATE: 24 BRPM | TEMPERATURE: 100 F | HEART RATE: 135 BPM | DIASTOLIC BLOOD PRESSURE: 47 MMHG | BODY MASS INDEX: 23.03 KG/M2 | SYSTOLIC BLOOD PRESSURE: 79 MMHG

## 2020-01-01 DIAGNOSIS — I50.9 ACUTE DECOMPENSATED HEART FAILURE: ICD-10-CM

## 2020-01-01 DIAGNOSIS — J96.01 ACUTE RESPIRATORY FAILURE WITH HYPOXEMIA: ICD-10-CM

## 2020-01-01 DIAGNOSIS — R06.02 SHORTNESS OF BREATH: ICD-10-CM

## 2020-01-01 DIAGNOSIS — J96.01 ACUTE RESPIRATORY FAILURE WITH HYPOXIA: Primary | ICD-10-CM

## 2020-01-01 DIAGNOSIS — R06.03 RESPIRATORY DISTRESS: ICD-10-CM

## 2020-01-01 DIAGNOSIS — J18.9 PNEUMONIA OF BOTH LOWER LOBES DUE TO INFECTIOUS ORGANISM: ICD-10-CM

## 2020-01-01 LAB
ALBUMIN SERPL BCP-MCNC: 2.8 G/DL (ref 3.5–5.2)
ALLENS TEST: ABNORMAL
ALP SERPL-CCNC: 70 U/L (ref 55–135)
ALT SERPL W/O P-5'-P-CCNC: 15 U/L (ref 10–44)
ANION GAP SERPL CALC-SCNC: 12 MMOL/L (ref 8–16)
AST SERPL-CCNC: 34 U/L (ref 10–40)
BASOPHILS # BLD AUTO: 0.03 K/UL (ref 0–0.2)
BASOPHILS NFR BLD: 0.2 % (ref 0–1.9)
BILIRUB SERPL-MCNC: 0.8 MG/DL (ref 0.1–1)
BNP SERPL-MCNC: 953 PG/ML (ref 0–99)
BUN SERPL-MCNC: 45 MG/DL (ref 10–30)
CALCIUM SERPL-MCNC: 8.7 MG/DL (ref 8.7–10.5)
CHLORIDE SERPL-SCNC: 101 MMOL/L (ref 95–110)
CO2 SERPL-SCNC: 25 MMOL/L (ref 23–29)
CREAT SERPL-MCNC: 1.1 MG/DL (ref 0.5–1.4)
CREAT SERPL-MCNC: 1.1 MG/DL (ref 0.5–1.4)
CRP SERPL-MCNC: 170.3 MG/L (ref 0–8.2)
DELSYS: ABNORMAL
DIFFERENTIAL METHOD: ABNORMAL
EOSINOPHIL # BLD AUTO: 0 K/UL (ref 0–0.5)
EOSINOPHIL NFR BLD: 0 % (ref 0–8)
ERYTHROCYTE [DISTWIDTH] IN BLOOD BY AUTOMATED COUNT: 12.6 % (ref 11.5–14.5)
ERYTHROCYTE [SEDIMENTATION RATE] IN BLOOD BY WESTERGREN METHOD: 25 MM/H
EST. GFR  (AFRICAN AMERICAN): 50 ML/MIN/1.73 M^2
EST. GFR  (NON AFRICAN AMERICAN): 43.4 ML/MIN/1.73 M^2
FIO2: 100
FLOW: 15
GLUCOSE SERPL-MCNC: 186 MG/DL (ref 70–110)
HCO3 UR-SCNC: 27.6 MMOL/L (ref 24–28)
HCT VFR BLD AUTO: 39.6 % (ref 37–48.5)
HCT VFR BLD CALC: 35 %PCV (ref 36–54)
HGB BLD-MCNC: 12.5 G/DL (ref 12–16)
IMM GRANULOCYTES # BLD AUTO: 0.09 K/UL (ref 0–0.04)
IMM GRANULOCYTES NFR BLD AUTO: 0.7 % (ref 0–0.5)
INFLUENZA A, MOLECULAR: NEGATIVE
INFLUENZA B, MOLECULAR: NEGATIVE
LACTATE SERPL-SCNC: 1.1 MMOL/L (ref 0.5–2.2)
LYMPHOCYTES # BLD AUTO: 1 K/UL (ref 1–4.8)
LYMPHOCYTES NFR BLD: 8.1 % (ref 18–48)
MCH RBC QN AUTO: 30.6 PG (ref 27–31)
MCHC RBC AUTO-ENTMCNC: 31.6 G/DL (ref 32–36)
MCV RBC AUTO: 97 FL (ref 82–98)
MODE: ABNORMAL
MONOCYTES # BLD AUTO: 0.7 K/UL (ref 0.3–1)
MONOCYTES NFR BLD: 5.3 % (ref 4–15)
NEUTROPHILS # BLD AUTO: 10.7 K/UL (ref 1.8–7.7)
NEUTROPHILS NFR BLD: 85.7 % (ref 38–73)
NRBC BLD-RTO: 0 /100 WBC
PCO2 BLDA: 46.4 MMHG (ref 35–45)
PH SMN: 7.38 [PH] (ref 7.35–7.45)
PLATELET # BLD AUTO: 227 K/UL (ref 150–350)
PMV BLD AUTO: 10.4 FL (ref 9.2–12.9)
PO2 BLDA: 65 MMHG (ref 80–100)
POC BE: 2 MMOL/L
POC IONIZED CALCIUM: 1.17 MMOL/L (ref 1.06–1.42)
POC SATURATED O2: 92 % (ref 95–100)
POC TCO2: 29 MMOL/L (ref 23–27)
POCT GLUCOSE: 217 MG/DL (ref 70–110)
POTASSIUM BLD-SCNC: 4.7 MMOL/L (ref 3.5–5.1)
POTASSIUM SERPL-SCNC: 5.5 MMOL/L (ref 3.5–5.1)
PROCALCITONIN SERPL IA-MCNC: 0.23 NG/ML
PROT SERPL-MCNC: 7.7 G/DL (ref 6–8.4)
RBC # BLD AUTO: 4.09 M/UL (ref 4–5.4)
SAMPLE: ABNORMAL
SAMPLE: NORMAL
SARS-COV-2 RNA RESP QL NAA+PROBE: DETECTED
SITE: ABNORMAL
SODIUM BLD-SCNC: 137 MMOL/L (ref 136–145)
SODIUM SERPL-SCNC: 138 MMOL/L (ref 136–145)
SP02: 93
SPECIMEN SOURCE: NORMAL
TROPONIN I SERPL DL<=0.01 NG/ML-MCNC: 0.1 NG/ML (ref 0–0.03)
TROPONIN I SERPL DL<=0.01 NG/ML-MCNC: 0.19 NG/ML (ref 0–0.03)
WBC # BLD AUTO: 12.53 K/UL (ref 3.9–12.7)

## 2020-01-01 PROCEDURE — 96365 THER/PROPH/DIAG IV INF INIT: CPT | Mod: HCNC

## 2020-01-01 PROCEDURE — 99291 CRITICAL CARE FIRST HOUR: CPT | Mod: HCNC,,, | Performed by: EMERGENCY MEDICINE

## 2020-01-01 PROCEDURE — 93005 ELECTROCARDIOGRAM TRACING: CPT | Mod: HCNC | Performed by: INTERNAL MEDICINE

## 2020-01-01 PROCEDURE — 86140 C-REACTIVE PROTEIN: CPT | Mod: HCNC

## 2020-01-01 PROCEDURE — 99291 PR CRITICAL CARE, E/M 30-74 MINUTES: ICD-10-PCS | Mod: HCNC,,, | Performed by: EMERGENCY MEDICINE

## 2020-01-01 PROCEDURE — 82962 GLUCOSE BLOOD TEST: CPT | Mod: 59,HCNC

## 2020-01-01 PROCEDURE — 93010 EKG 12-LEAD: ICD-10-PCS | Mod: HCNC,,, | Performed by: INTERNAL MEDICINE

## 2020-01-01 PROCEDURE — 63600175 PHARM REV CODE 636 W HCPCS: Mod: HCNC | Performed by: STUDENT IN AN ORGANIZED HEALTH CARE EDUCATION/TRAINING PROGRAM

## 2020-01-01 PROCEDURE — 85014 HEMATOCRIT: CPT | Mod: HCNC

## 2020-01-01 PROCEDURE — 85025 COMPLETE CBC W/AUTO DIFF WBC: CPT | Mod: HCNC

## 2020-01-01 PROCEDURE — 84132 ASSAY OF SERUM POTASSIUM: CPT | Mod: HCNC

## 2020-01-01 PROCEDURE — 99900035 HC TECH TIME PER 15 MIN (STAT): Mod: HCNC

## 2020-01-01 PROCEDURE — 27000221 HC OXYGEN, UP TO 24 HOURS: Mod: HCNC

## 2020-01-01 PROCEDURE — 87040 BLOOD CULTURE FOR BACTERIA: CPT | Mod: HCNC

## 2020-01-01 PROCEDURE — 96361 HYDRATE IV INFUSION ADD-ON: CPT | Mod: HCNC

## 2020-01-01 PROCEDURE — 87502 INFLUENZA DNA AMP PROBE: CPT | Mod: HCNC

## 2020-01-01 PROCEDURE — 99291 CRITICAL CARE FIRST HOUR: CPT | Mod: 25,HCNC

## 2020-01-01 PROCEDURE — 93005 ELECTROCARDIOGRAM TRACING: CPT | Mod: HCNC

## 2020-01-01 PROCEDURE — 96366 THER/PROPH/DIAG IV INF ADDON: CPT | Mod: HCNC

## 2020-01-01 PROCEDURE — 63600175 PHARM REV CODE 636 W HCPCS: Mod: HCNC | Performed by: INTERNAL MEDICINE

## 2020-01-01 PROCEDURE — 83880 ASSAY OF NATRIURETIC PEPTIDE: CPT | Mod: HCNC

## 2020-01-01 PROCEDURE — 80053 COMPREHEN METABOLIC PANEL: CPT | Mod: HCNC

## 2020-01-01 PROCEDURE — 94761 N-INVAS EAR/PLS OXIMETRY MLT: CPT | Mod: HCNC

## 2020-01-01 PROCEDURE — 63600175 PHARM REV CODE 636 W HCPCS: Performed by: INTERNAL MEDICINE

## 2020-01-01 PROCEDURE — 83605 ASSAY OF LACTIC ACID: CPT | Mod: HCNC

## 2020-01-01 PROCEDURE — 96375 TX/PRO/DX INJ NEW DRUG ADDON: CPT | Mod: HCNC

## 2020-01-01 PROCEDURE — 63600175 PHARM REV CODE 636 W HCPCS: Performed by: STUDENT IN AN ORGANIZED HEALTH CARE EDUCATION/TRAINING PROGRAM

## 2020-01-01 PROCEDURE — 20600001 HC STEP DOWN PRIVATE ROOM: Mod: HCNC

## 2020-01-01 PROCEDURE — 36600 WITHDRAWAL OF ARTERIAL BLOOD: CPT | Mod: HCNC

## 2020-01-01 PROCEDURE — 93010 ELECTROCARDIOGRAM REPORT: CPT | Mod: HCNC,,, | Performed by: INTERNAL MEDICINE

## 2020-01-01 PROCEDURE — 84484 ASSAY OF TROPONIN QUANT: CPT | Mod: HCNC

## 2020-01-01 PROCEDURE — 84295 ASSAY OF SERUM SODIUM: CPT | Mod: HCNC

## 2020-01-01 PROCEDURE — 82330 ASSAY OF CALCIUM: CPT | Mod: HCNC

## 2020-01-01 PROCEDURE — 25500020 PHARM REV CODE 255: Performed by: EMERGENCY MEDICINE

## 2020-01-01 PROCEDURE — 82803 BLOOD GASES ANY COMBINATION: CPT | Mod: HCNC

## 2020-01-01 PROCEDURE — 96367 TX/PROPH/DG ADDL SEQ IV INF: CPT | Mod: HCNC

## 2020-01-01 PROCEDURE — 84145 PROCALCITONIN (PCT): CPT | Mod: HCNC

## 2020-01-01 PROCEDURE — U0002 COVID-19 LAB TEST NON-CDC: HCPCS | Mod: HCNC

## 2020-01-01 PROCEDURE — 25000003 PHARM REV CODE 250: Performed by: STUDENT IN AN ORGANIZED HEALTH CARE EDUCATION/TRAINING PROGRAM

## 2020-01-01 RX ORDER — MORPHINE SULFATE 2 MG/ML
1 INJECTION, SOLUTION INTRAMUSCULAR; INTRAVENOUS
Status: DISCONTINUED | OUTPATIENT
Start: 2020-01-01 | End: 2020-01-01 | Stop reason: HOSPADM

## 2020-01-01 RX ORDER — VANCOMYCIN 2 GRAM/500 ML IN 0.9 % SODIUM CHLORIDE INTRAVENOUS
2000 ONCE
Status: DISCONTINUED | OUTPATIENT
Start: 2020-01-01 | End: 2020-01-01

## 2020-01-01 RX ORDER — GLYCOPYRROLATE 1 MG/5ML
1 SOLUTION ORAL 3 TIMES DAILY PRN
Status: DISCONTINUED | OUTPATIENT
Start: 2020-01-01 | End: 2020-01-01 | Stop reason: HOSPADM

## 2020-01-01 RX ORDER — SODIUM CHLORIDE 0.9 % (FLUSH) 0.9 %
10 SYRINGE (ML) INJECTION
Status: CANCELLED | OUTPATIENT
Start: 2020-01-01

## 2020-01-01 RX ORDER — FUROSEMIDE 10 MG/ML
80 INJECTION INTRAMUSCULAR; INTRAVENOUS
Status: COMPLETED | OUTPATIENT
Start: 2020-01-01 | End: 2020-01-01

## 2020-01-01 RX ORDER — LORAZEPAM 2 MG/ML
1 INJECTION INTRAMUSCULAR EVERY 30 MIN PRN
Status: DISCONTINUED | OUTPATIENT
Start: 2020-01-01 | End: 2020-01-01 | Stop reason: HOSPADM

## 2020-01-01 RX ORDER — HALOPERIDOL 5 MG/ML
1 INJECTION INTRAMUSCULAR EVERY 4 HOURS PRN
Status: DISCONTINUED | OUTPATIENT
Start: 2020-01-01 | End: 2020-01-01 | Stop reason: HOSPADM

## 2020-01-01 RX ORDER — LORAZEPAM 2 MG/ML
0.5 INJECTION INTRAMUSCULAR EVERY 30 MIN PRN
Status: DISCONTINUED | OUTPATIENT
Start: 2020-01-01 | End: 2020-01-01 | Stop reason: HOSPADM

## 2020-01-01 RX ADMIN — LORAZEPAM 1 MG: 2 INJECTION INTRAMUSCULAR; INTRAVENOUS at 04:03

## 2020-01-01 RX ADMIN — IOHEXOL 75 ML: 350 INJECTION, SOLUTION INTRAVENOUS at 03:03

## 2020-01-01 RX ADMIN — SODIUM CHLORIDE 1000 ML: 0.9 INJECTION, SOLUTION INTRAVENOUS at 01:03

## 2020-01-01 RX ADMIN — LORAZEPAM 1 MG: 2 INJECTION INTRAMUSCULAR; INTRAVENOUS at 02:03

## 2020-01-01 RX ADMIN — VANCOMYCIN HYDROCHLORIDE 1250 MG: 1.25 INJECTION, POWDER, LYOPHILIZED, FOR SOLUTION INTRAVENOUS at 02:03

## 2020-01-01 RX ADMIN — MORPHINE SULFATE 1 MG: 2 INJECTION, SOLUTION INTRAMUSCULAR; INTRAVENOUS at 10:03

## 2020-01-01 RX ADMIN — AZITHROMYCIN MONOHYDRATE 500 MG: 500 INJECTION, POWDER, LYOPHILIZED, FOR SOLUTION INTRAVENOUS at 04:03

## 2020-01-01 RX ADMIN — LORAZEPAM 1 MG: 2 INJECTION INTRAMUSCULAR; INTRAVENOUS at 11:03

## 2020-01-01 RX ADMIN — LORAZEPAM 1 MG: 2 INJECTION INTRAMUSCULAR; INTRAVENOUS at 12:03

## 2020-01-01 RX ADMIN — LORAZEPAM 1 MG: 2 INJECTION INTRAMUSCULAR; INTRAVENOUS at 09:03

## 2020-01-01 RX ADMIN — MORPHINE SULFATE 1 MG: 2 INJECTION, SOLUTION INTRAMUSCULAR; INTRAVENOUS at 04:03

## 2020-01-01 RX ADMIN — MORPHINE SULFATE 1 MG: 2 INJECTION, SOLUTION INTRAMUSCULAR; INTRAVENOUS at 08:03

## 2020-01-01 RX ADMIN — PIPERACILLIN AND TAZOBACTAM 4.5 G: 4; .5 INJECTION, POWDER, LYOPHILIZED, FOR SOLUTION INTRAVENOUS; PARENTERAL at 01:03

## 2020-01-01 RX ADMIN — LORAZEPAM 1 MG: 2 INJECTION INTRAMUSCULAR; INTRAVENOUS at 10:03

## 2020-01-01 RX ADMIN — LORAZEPAM 1 MG: 2 INJECTION INTRAMUSCULAR; INTRAVENOUS at 03:03

## 2020-01-01 RX ADMIN — MORPHINE SULFATE 1 MG: 2 INJECTION, SOLUTION INTRAMUSCULAR; INTRAVENOUS at 05:03

## 2020-01-01 RX ADMIN — FUROSEMIDE 80 MG: 10 INJECTION, SOLUTION INTRAMUSCULAR; INTRAVENOUS at 04:03

## 2020-01-01 RX ADMIN — LORAZEPAM 1 MG: 2 INJECTION INTRAMUSCULAR; INTRAVENOUS at 08:03

## 2020-01-03 NOTE — TELEPHONE ENCOUNTER
They are over half way home coming from [place] and they forgot her meds in bathroom in [place].  Sister is going to overnight them and she needs  bp meds for tonite and in am. I told her i'd call [facility] now.    I called in amlodipine 10mg #2,  carvdiol 25mg bid #2, losartan 100mg qd #2 to pharmacist and asked her to do a ins override.    I am sure you are ok with this but need you to document so.    Thanks loretta

## 2020-01-03 NOTE — TELEPHONE ENCOUNTER
----- Message from Kristi Mays sent at 1/3/2020  9:58 AM CST -----  Contact: More ( daughter) [phone]  More state that the patient medication was left in [place] and is asking for refills for tonight and tomorrow morning. More state the medication will be mailed overnight. The patient pharmacy [id number] - [place], [place] - [address] AT [address] [facility] [address] [phone] (Phone)  [phone] (Fax).   Please call and advise.

## 2020-03-16 NOTE — TELEPHONE ENCOUNTER
----- Message from Keri Mora sent at 3/16/2020  2:53 PM CDT -----  Contact: dalia/celestina/[phone]  Pt called in regards to getting her epp lab orders put into the system and she would like to go on 07-14-20 at 9:00 am. They would like a call back.       Please advise

## 2020-03-22 PROBLEM — J96.01 ACUTE RESPIRATORY FAILURE WITH HYPOXIA: Status: ACTIVE | Noted: 2020-01-01

## 2020-03-22 NOTE — ED TRIAGE NOTES
Patient reports to the ED today with reports of Respiratory distress per EMS patient was found at home with decrease in LOC and O2 sat 50% on RA. Patient placed on 10L nasal cannula O2 sat in 80s. Upon arrival to room patient placed on 15L nonrebreather O2 sat 95% Patient tachypenic at 30bpm

## 2020-03-22 NOTE — ED NOTES
Referral made to [facility] for IP hospice bed.   Nurse in call state that she will have intake call back to evaluate this patient and speak with family.

## 2020-03-22 NOTE — ED PROVIDER NOTES
Encounter Date: 3/22/2020       History     Chief Complaint   Patient presents with    Respiratory Distress     found at home with O2 sat 50% O2 sat 95% on 15L nonrebreather     Ms. Stephenson is a 89 Y/o F with PMH of CAD, Afib not on anticoagulants, Non-ischemic Cardiomyopthay T2DM,. Presents to the ED via EMS with acute respiratory failure with hypoxia sating on room air at 50 %. Patient is arrousable but incoherent and responds only to painful stimuli. Per collateral from daughter, More Julien, who is also POA she reports that her Mother developed a cough with white suptum production Friday 3/13/20 after being cared for that weekend by her other daughter who is currently in isolation due to COVID rule out. Ms. Julien reports that her mother began to steadily decline over the week becoming more weak, refusing to eat and drink, becoming hypotensive, and short of breath. She became febrile 2 nights ago with a T max of 100.4F For which she was given Tylenol. Ms. Julien reports holding her antihypertensives and Metformin for the last 3 days due to her Mother's decline, hypotension and poor appetite. Per the daughter who is POA it is her mother's wishes to be DNR/DNI and paperwork was signed for patient by proxy after much discussion. Patient is currently on a non-rebreather, afebrile, tachycardic and hypotensive.         Review of patient's allergies indicates:  No Known Allergies  Past Medical History:   Diagnosis Date    Atrial fibrillation     Depression     Diverticulosis     GI bleed     HTN (hypertension)     Hyperlipidemia     UTI (urinary tract infection)      Past Surgical History:   Procedure Laterality Date    HYSTERECTOMY      WRIST SURGERY       Family History   Problem Relation Age of Onset    Diabetes Mother     No Known Problems Father     Hypertension Sister     Hyperlipidemia Sister     Diabetes Brother     Hypertension Brother     Hyperlipidemia Brother     Hyperlipidemia  Daughter     Hypertension Son     Colon cancer Neg Hx     Esophageal cancer Neg Hx      Social History     Tobacco Use    Smoking status: Never Smoker    Smokeless tobacco: Never Used   Substance Use Topics    Alcohol use: No     Alcohol/week: 0.0 standard drinks    Drug use: No     Review of Systems   Unable to perform ROS: Mental status change       Physical Exam     Initial Vitals   BP Pulse Resp Temp SpO2   03/22/20 1245 03/22/20 1255 03/22/20 1255 03/22/20 1954 03/22/20 1255   (!) 161/70 98 (!) 50 99.8 °F (37.7 °C) (!) 93 %      MAP       --                Physical Exam    Constitutional: She appears lethargic. She appears cachectic. She is uncooperative. She has a sickly appearance. No distress. Face mask in place.   HENT:   Head: Normocephalic and atraumatic.   Mouth/Throat: No oropharyngeal exudate.   Eyes: Conjunctivae and EOM are normal. No scleral icterus.   Neck: Neck supple.   Cardiovascular: Regular rhythm. Tachycardia present.    Pulmonary/Chest: No respiratory distress. She has rales (Right Anterior Chest).   Abdominal: Soft. Bowel sounds are normal. She exhibits no distension. There is no tenderness.   Musculoskeletal: She exhibits no edema or tenderness.   Neurological: She appears lethargic. She exhibits abnormal muscle tone. GCS eye subscore is 4. GCS verbal subscore is 1. GCS motor subscore is 5.   Skin: Skin is warm and dry. No erythema.   Psychiatric: Cognition and memory are impaired.         ED Course   Procedures  Labs Reviewed   CBC W/ AUTO DIFFERENTIAL - Abnormal; Notable for the following components:       Result Value    Mean Corpuscular Hemoglobin Conc 31.6 (*)     Immature Granulocytes 0.7 (*)     Gran # (ANC) 10.7 (*)     Immature Grans (Abs) 0.09 (*)     Gran% 85.7 (*)     Lymph% 8.1 (*)     All other components within normal limits   COMPREHENSIVE METABOLIC PANEL - Abnormal; Notable for the following components:    Potassium 5.5 (*)     Glucose 186 (*)     BUN, Bld 45 (*)      Albumin 2.8 (*)     eGFR if  50.0 (*)     eGFR if non  43.4 (*)     All other components within normal limits   B-TYPE NATRIURETIC PEPTIDE - Abnormal; Notable for the following components:     (*)     All other components within normal limits   TROPONIN I - Abnormal; Notable for the following components:    Troponin I 0.097 (*)     All other components within normal limits   C-REACTIVE PROTEIN - Abnormal; Notable for the following components:    .3 (*)     All other components within normal limits   TROPONIN I - Abnormal; Notable for the following components:    Troponin I 0.187 (*)     All other components within normal limits   POCT GLUCOSE - Abnormal; Notable for the following components:    POCT Glucose 217 (*)     All other components within normal limits   ISTAT PROCEDURE - Abnormal; Notable for the following components:    POC PCO2 46.4 (*)     POC PO2 65 (*)     POC SATURATED O2 92 (*)     POC TCO2 29 (*)     POC Hematocrit 35 (*)     All other components within normal limits   INFLUENZA A & B BY MOLECULAR   PROCALCITONIN   LACTIC ACID, PLASMA   URINALYSIS, REFLEX TO URINE CULTURE   SARS-COV-2 (COVID-19) QUALITATIVE PCR   ISTAT CREATININE   POCT GLUCOSE MONITORING CONTINUOUS     EKG Readings: (Independently Interpreted)   Initial Reading: No STEMI. Previous EKG: Compared with most recent EKG Rhythm: Normal Sinus Rhythm. ST Segments: Non-Specific ST Segment Depression.       Imaging Results           CTA Chest Non-Coronary (PE Study) (Final result)  Result time 03/22/20 16:31:04    Final result by Johan Walden MD (03/22/20 16:31:04)                 Impression:      1. No definite evidence of pulmonary embolism noting exam is degraded by significant artifact.  2. Multifocal bilateral pneumonia concerning for infectious process including Covid-19. Differentials include pulmonary edema, hemorrhage, or cryptogenic organizing pneumonia.  3. Cardiomegaly.  4.  Severe aortic calcific atherosclerosis.  This report was flagged in Epic as abnormal.    Electronically signed by resident: Lilibeth Baker  Date:    03/22/2020  Time:    15:46    Electronically signed by: Johan Walden MD  Date:    03/22/2020  Time:    16:31             Narrative:    EXAMINATION:  CTA CHEST NON CORONARY    CLINICAL HISTORY:  Chest pain, acute, PE suspected, high pretest prob;    TECHNIQUE:  Low dose axial images, sagittal and coronal reformations were obtained from the thoracic inlet to the lung bases following the IV administration of 75 mL of Omnipaque 350.  Contrast timing was optimized to evaluate the pulmonary arteries.    COMPARISON:  Chest radiograph 03/22/2020    FINDINGS:  Exam is degraded by motion and streak artifact from patient's arm in the gantry.    Pulmonary arteries distribute normally.  There is no visualized intraluminal filling defect, although evaluation of the subsegmental arteries is degraded by artifact and airspace disease.    There is diffuse bilateral patchy consolidation and ground-glass opacities with mid and lower lung zone predominance, worse on the right.  Findings concerning for multifocal pneumonia from infectious etiology.  Differentials include pulmonary edema, hemorrhage, or cryptogenic organizing pneumonia,.    The heart is enlarged.  No significant pericardial fluid.  There are dense coronary artery calcifications.    The aorta is tortuous with dense atherosclerotic calcifications.  No aortic aneurysm.    Esophagus maintains normal caliber and course.  No bowel distension, free air, biliary dilatation or other significant abnormality involving structures in the upper abdomen.  The osseous structures degenerative changes.  No acute fracture.  No significant abnormality of the extrathoracic soft tissues.                               CT Head Without Contrast (Final result)  Result time 03/22/20 15:39:10    Final result by Marlo East MD (03/22/20  15:39:10)                 Impression:      1. Sequela of chronic microvascular ischemic change and senescent change.  There are punctate foci of low attenuation involving the bilateral caudate as well as anterior limb of the right internal capsule.  Findings suggest that of remote lacunar infarcts noting punctate foci within the left caudate appear new since most recent examination of 2008.  Comparison with any recent examinations would be helpful, correlation with current symptomatology as warranted.  2. Right mastoid effusion.      Electronically signed by: aMrlo East MD  Date:    03/22/2020  Time:    15:39             Narrative:    EXAMINATION:  CT HEAD WITHOUT CONTRAST    CLINICAL HISTORY:  Confusion/delirium, altered LOC, unexplained;    TECHNIQUE:  Low dose axial images were obtained through the head.  Coronal and sagittal reformations were also performed. Contrast was not administered.    COMPARISON:  07/10/2008    FINDINGS:  There is generalized cerebral volume loss.  There is hypoattenuation in a periventricular fashion, likely sequela of chronic microvascular ischemic change.There are a few punctate foci of low attenuation involving the right caudate and anterior limb of the right internal capsule.  Additional punctate foci are noted within the left caudate, not well demonstrated on previous exam.  There is no evidence of acute major vascular territory infarct, hemorrhage, or mass.  There is no hydrocephalus.  There are no abnormal extra-axial fluid collections.  There is hypoplasia and minimal fluid within the right mastoid air cells, otherwise the visualized paranasal sinuses and mastoid air cells are clear, and there is no evidence of calvarial fracture.  The visualized soft tissues are unremarkable.                               X-Ray Chest AP Portable (Final result)  Result time 03/22/20 14:46:03    Final result by Johan Walden MD (03/22/20 14:46:03)                 Impression:       Bilateral patchy opacities in the lower lung zones bilaterally which may be seen with multifocal pneumonia or pulmonary edema.      Electronically signed by: Johan Walden MD  Date:    03/22/2020  Time:    14:46             Narrative:    EXAMINATION:  XR CHEST AP PORTABLE    CLINICAL HISTORY:  shortness of breath;    TECHNIQUE:  Single frontal view of the chest was performed.    COMPARISON:  01/07/2019    FINDINGS:  Moderate cardiomegaly.  Atherosclerosis of the aortic arch.  Bilateral patchy opacities in the lower lung zones bilaterally which may be seen with multifocal pneumonia or pulmonary edema.  No pleural effusions.  No pneumothorax.  Degenerative changes of the spine.                              X-Rays:   Independently Interpreted Readings:   Chest X-Ray: Normal heart size. Patchy infiltrates bilaterally with Right worse than the left.     Medical Decision Making:   History:   Old Medical Records: I decided to obtain old medical records.  Initial Assessment:   89 y/o F with Multiple Comorbidities and now DNR/DNI presents with AMS and Acute respiratory failure with hypoxia with recent contact with a suspected COVID positive person  Differential Diagnosis:   DDX: Acute Respiratory Failure with Hypoxia, Influenza, COVID 19, ARDS, PNA, Sepsis, Acute Encephalopathy  Clinical Tests:   Lab Tests: Ordered and Reviewed  Radiological Study: Ordered and Reviewed  Medical Tests: Ordered and Reviewed  Sepsis Perfusion Assessment: I attest, a sepsis perfusion exam was performed within 6 hours of Septic Shock presentation, following fluid resuscitation.  ED Management:  1:36 PM: Patient is minimally responsive. Infectious work up initiated + COVID 19 testing due to patient having Cough, SOB, and recent fever and being in contact with someone who is suspected COVID positive. CT head was ordered due to patient being encephalopathic and her age meeting NEXUS criteria and CXR to rule out aspiration PNA vis other  Cardio-pulmonary pathology.    3:09 PM: Patient is tachypenic, tachycardic concerned at this time that her hypoxia could be driven due to a PE. Will obtain a CTA of the chest for rule out. Cr on ISTAT was 1.1     3:33 PM: CXR shows patchy bibasilar infiltrates with consolidation right more than left likely representing PNA. Patient received vanc and zosyn and will start on azithromycin for atypical coverage.    3:56 PM: CT head shows some chronic microvascular changes, with new appearing punctate hemorrhages in the internal capsule and Caudate which per remarks from radiology read does not appear acute but is new since sac in 2008. Patient's BNP is 983, Troponin is elevated at 0.097 likely due to demand ischemia. Patient was ordered 80 mg of IV lasix. Waiting on CTA of chest for final read and CMO and lactic acid. Patient has a leukocytosis of 12K    4:19 PM: due to patient's deteriorating condition and concern for poor prognosis, Daughter More who is POA was spoken to about hospice and placing the patient on comfort measure and in the inpatient hospice unit. More has asked to discuss the situation with her sister before making a discission. Patient's daughter was returned the theodore necklace her mother was wearing around her neck before heading off to CT    4:34 PM: CTA showed no PE but is concerning for multifocal bilateral pneumonia concerning for infectious process including Covid-19.     Additional MDM:     Well's Criteria Score:  -Clinical symptoms of DVT (leg swelling, pain with palpation) = 0.0  -Other diagnosis less likely than pulmonary embolism =            3.0  -Heart Rate >100 =   1.5  -Immobilization (= or > than 3 days) or surgery in the previous 4 weeks = 0.0  -Previous DVT/PE = 0.0  -Hemoptysis =          0.0  -Malignancy =           0.0  Well's Probability Score =    4.5                 Attending Attestation:   Physician Attestation Statement for Resident:  As the supervising MD   Physician  Attestation Statement: I have personally seen and examined this patient.   I agree with the above history. -:   As the supervising MD I agree with the above PE.    As the supervising MD I agree with the above treatment, course, plan, and disposition.        Attending Critical Care:   Critical Care Times:   Direct Patient Care (initial evaluation, reassessments, and time considering the case)................................................................25 minutes.   Additional History from reviewing old medical records or taking additional history from the family, EMS, PCP, etc.......................10 minutes.   Ordering, Reviewing, and Interpreting Diagnostic Studies...............................................................................................................5 minutes.   Documentation..................................................................................................................................................................................5 minutes.   Consultation with other Physicians. .................................................................................................................................................5 minutes.   Consultation with the patient's family directly relating to the patient's condition, care, and DNR status (when patient unable)......15 minutes.   ==============================================================  · Total Critical Care Time - exclusive of procedural time: 65 minutes.  ==============================================================  Critical care was necessary to treat or prevent imminent or life-threatening deterioration of the following conditions: sepsis and respiratory failure.   Critical care was time spent personally by me on the following activities: obtaining history from patient or relative, examination of patient, review of x-rays / CT sent with the patient, review of old charts, ordering lab, x-rays, and/or EKG,  development of treatment plan with patient or relative, ordering and performing treatments and interventions, discussion with consultants, end of life discussions, re-evaluation of patient's conition and interpretation of cardiac measurements.   Critical Care Condition: life-threatening       Attending ED Notes:   STAFF ATTENDING PHYSICIAN NOTE:  I have individually/jointly evaluated Sachin Birch and discussed their ED management with the resident physician. I have also reviewed their notes, assessments, and procedures documented.  I was present during all critical portions of any procedure(s) performed on Sachin Birch, who presents hypoxic, altered, in severe respiratory distress likely secondary to severe sepsis from bilateral lobar pneumonia.  Extensive discussion with healthcare proxy/daughter and family throughout the resuscitation process regarding patient's DNR DNI status.  Broad-spectrum antibiotics, fluid resuscitation provided until admission to hospice care at the wishes of the family.   ___________________  Anthony Duff MD, [facility]  Emergency Medicine Staff                          Clinical Impression:       ICD-10-CM ICD-9-CM   1. Acute respiratory failure with hypoxia J96.01 518.81   2. Shortness of breath R06.02 786.05   3. Respiratory distress R06.03 786.09   4. Pneumonia of both lower lobes due to infectious organism J18.1 483.8   5. Acute decompensated heart failure I50.9 428.0   6. Acute respiratory failure with hypoxemia J96.01 518.81         Disposition:   Disposition: Admitted  Condition: Critical     ED Disposition Condition    Admit                         Ivan Duff MD  03/23/20 114

## 2020-03-22 NOTE — TELEPHONE ENCOUNTER
More Julien is calling on behalf of her mother, Sachin Birch, who is a 89 y/o female who has had sx of cough, poor po intake, lethargy, and impaired responsiveness over the past few days. Per daughter, there is a decline in function from her mother's baseline. Also, pt received a COVID-19 test at [facility] today and collapsed after returning home. Mrs. Julien is concerned as her mother's blood pressures are much lower than usual - today  Was 101/50 as opposed to baseline of 150/80.    Pt has recent exposure to a suspected COVID-19 case as her other daughter (not the caller) is currently quarantined 2/2 suspicious COVID-19 sx w/ test results awaiting. Pt has had no other sick contacts. No recent international travel.     After discussion with the patient, she is recommended to visit the [facility] ED via EMS. Pt's daughter agrees with plan.    I attempted to alert ED personnel of arrival but was placed on long hold likely due to heavy traffic and had to hang up.      Reason for Disposition   Patient sounds very sick or weak to the triager    Additional Information   Negative: Severe difficulty breathing (e.g., struggling for each breath, speak in single words, bluish lips)   Negative: Sounds like a life-threatening emergency to the triager   Negative: [1] Difficulty breathing (shortness of breath) occurs AND [2] onset > 14 days after COVID-19 EXPOSURE (Close Contact)   Negative: [1] Dry cough occurs AND [2] onset > 14 days after COVID-19 EXPOSURE   Negative: [1] Wet cough (i.e., white-yellow, yellow, green, or terri colored sputum) AND [2] onset > 14 days after COVID-19 EXPOSURE   Negative: [1] Common cold symptoms AND [2] onset > 14 days after COVID-19 EXPOSURE    Protocols used: CORONAVIRUS (COVID-19) EXPOSURE-A-

## 2020-03-22 NOTE — ED NOTES
Spoke to Lynn, Intake on call.  She states that she will be here in 30-45 mins to evaluate the patient.

## 2020-03-23 PROBLEM — J96.01 ACUTE RESPIRATORY FAILURE WITH HYPOXEMIA: Status: ACTIVE | Noted: 2020-01-01

## 2020-03-23 NOTE — H&P
[facility]  H&P    Patient Name: Sachin Birch  MRN: [id number]  Admission Date: 3/22/2020  Hospital Length of Stay: 1 days  Code Status: DNR   Attending Provider: Guille Wolf MD      Assessment/Plan:     Hospice qualifying diagnosis:  Acute respiratory failure with hypoxia    Hospice Encounter / Goals of care discussion:     Narrative: Sachin Birch is a 89 yo lady, who was living at home self sufficient until lately. Her   7 years ago and she has been very active enjoying time with her daughter until she experienced fatigue, weakness and fever about 7 days ago.   She was taken in by her daughter but continued to weaken, experiencing poor appetite and little intake. She then developed a fever and was brought to the ED.     She is found to have profound hypoxemia, fever and Chest CT findings showing patchy pulmonary infiltrates suggesting Covid-19 infection. Testing had been performed at an outside lab a few days prior.     Discussions with the ED physician was held. The pt. Has a POA (daughter More) and had discussed her wishes in detail in the past and was clear that she would not want aggressive care and mechanical ventilation if she were to get ill.     Hospice was consulted and the pt. Admitted with acute severe hypoxemic respiratory failure.       1: Symptom Assessment:     - Pain: no pain apparent     - Dyspnea: breathing unlabored     - Agitation: not agitated     - Mentation: somnolent mostly, however responds to family at times    3: Current Treatment regiment   Comfort care per routine    4: Family discussion   Spoke with family at length. Concerns addressed. Plan of care discussed and agreed to focus on comfort only.     5: Plan of care   Continue comfort oriented care only    6: Follow up plans:    daily      Subjective:     Hospice Qualifying Diagnosis:   Acute on hypoxemic respiratory failure.        Past Medical History:   Diagnosis Date    Atrial fibrillation      Depression     Diverticulosis     GI bleed     HTN (hypertension)     Hyperlipidemia     UTI (urinary tract infection)        Past Surgical History:   Procedure Laterality Date    HYSTERECTOMY      WRIST SURGERY          Family History     Problem Relation (Age of Onset)    Diabetes Mother, Brother    Hyperlipidemia Sister, Brother, Daughter    Hypertension Sister, Brother, Son    No Known Problems Father          Tobacco Use    Smoking status: Never Smoker    Smokeless tobacco: Never Used   Substance and Sexual Activity    Alcohol use: No     Alcohol/week: 0.0 standard drinks    Drug use: No    Sexual activity: Not Currently     Partners: Male       Review of patient's allergies indicates:  No Known Allergies      Review of Symptoms    Review of Systems   Unable to obtain    Objective:     Vital Signs (Most Recent):  BP (!) 145/65   Pulse 80   Temp 96.2 °F (35.7 °C) (Axillary)   Resp 16   Ht 5' (1.524 m)   Wt 53.2 kg (117 lb 4.6 oz)   SpO2 (!) 85%   Breastfeeding? No   BMI 22.91 kg/m²         Physical Exam  No exam due to covid 19 isolation    Medications:  Scheduled Meds:    Scheduled Meds:  Continuous Infusions:  PRN Meds:.glycopyrrolate, haloperidol lactate, lorazepam, lorazepam, morphine       Significant Imaging: I have reviewed all pertinent imaging results/findings within the past 24 hours.    Advanced Directives::  Living Will: No  LaPOST: No  Do Not Resuscitate Status: Yes  Surrogate Decision maker / Medical Power of : POA      > 50% of 40 min visit spent in chart review, face to face discussion of goals of care,  symptom assessment, coordination of care and emotional support.    Guille Wolf MD  Palliative Medicine  [facility]

## 2020-03-23 NOTE — ED NOTES
Tele box [id number] applied to pt. camelia in war room states able to see pt on monitor, rhythm NS  with HR 73.

## 2020-03-23 NOTE — PLAN OF CARE
Problem: Fall Injury Risk  Goal: Absence of Fall and Fall-Related Injury  Outcome: Ongoing, Not Progressing     Problem: Adult Inpatient Plan of Care  Goal: Plan of Care Review  Outcome: Ongoing, Not Progressing  Goal: Patient-Specific Goal (Individualization)  Outcome: Ongoing, Not Progressing  Goal: Absence of Hospital-Acquired Illness or Injury  Outcome: Ongoing, Not Progressing  Goal: Optimal Comfort and Wellbeing  Outcome: Ongoing, Not Progressing  Goal: Readiness for Transition of Care  Outcome: Ongoing, Not Progressing  Goal: Rounds/Family Conference  Outcome: Ongoing, Not Progressing     Problem: Diabetes Comorbidity  Goal: Blood Glucose Level Within Desired Range  Outcome: Ongoing, Not Progressing     Problem: Skin Injury Risk Increased  Goal: Skin Health and Integrity  Outcome: Ongoing, Not Progressing     Problem: Palliative Care  Goal: Enhanced Quality of Life  Outcome: Ongoing, Not Progressing     Pt is awake upon touch and sound, but rarely speaks when spoken to.  Still awaiting COVID r/o.  Pt is now in rm 65 managed by [facility] hospice.  Hourly safety checks performed, VSS on 16L nonrebreather, will continue to monitor.      Anuradha Pollack RN  5:35 PM

## 2020-03-23 NOTE — PLAN OF CARE
Problem: Adult Inpatient Plan of Care  Goal: Plan of Care Review  3/23/2020 0520 by Joanne Huang LPN  Outcome: Ongoing, Progressing  3/23/2020 0457 by Joanne Huang LPN  Outcome: Ongoing, Progressing    Plan of care reviewed with daughter Bridgett. Pt  response to voices and follows commands, Incontinent of bowel and bladder.  Frequent monitoring ensuring non rebreather in place No acute   events this shift, bed in lowest position monitoring ongoing

## 2020-03-24 NOTE — PLAN OF CARE
Problem: Fall Injury Risk  Goal: Absence of Fall and Fall-Related Injury  3/24/2020 1724 by Anuradha Pollack RN  Outcome: Ongoing, Progressing  3/24/2020 1456 by Anuradha Pollack RN  Outcome: Ongoing, Progressing     Problem: Adult Inpatient Plan of Care  Goal: Plan of Care Review  3/24/2020 1724 by Anuradha Pollack RN  Outcome: Ongoing, Progressing  3/24/2020 1456 by Anuradha Pollack RN  Outcome: Ongoing, Progressing  Goal: Patient-Specific Goal (Individualization)  3/24/2020 1724 by Anuradha Pollack RN  Outcome: Ongoing, Progressing  3/24/2020 1456 by Anuradha Pollack RN  Outcome: Ongoing, Progressing  Goal: Absence of Hospital-Acquired Illness or Injury  3/24/2020 1724 by Anuradha Pollack RN  Outcome: Ongoing, Progressing  3/24/2020 1456 by Anuradha Pollack RN  Outcome: Ongoing, Progressing  Goal: Optimal Comfort and Wellbeing  3/24/2020 1724 by Anuradha Pollack RN  Outcome: Ongoing, Progressing  3/24/2020 1456 by Anuradha Pollack RN  Outcome: Ongoing, Progressing  Goal: Readiness for Transition of Care  3/24/2020 1724 by Anuradha Pollack RN  Outcome: Ongoing, Progressing  3/24/2020 1456 by Anuradha Pollack RN  Outcome: Ongoing, Progressing  Goal: Rounds/Family Conference  3/24/2020 1724 by Anuradha Pollack RN  Outcome: Ongoing, Progressing  3/24/2020 1456 by Anuradha Pollack RN  Outcome: Ongoing, Progressing     Problem: Diabetes Comorbidity  Goal: Blood Glucose Level Within Desired Range  3/24/2020 1724 by Anuradha Pollack RN  Outcome: Ongoing, Progressing  3/24/2020 1456 by Anuradha Pollack RN  Outcome: Ongoing, Progressing     Problem: Skin Injury Risk Increased  Goal: Skin Health and Integrity  3/24/2020 1724 by Anuradha Pollack RN  Outcome: Ongoing, Progressing  3/24/2020 1456 by Anuradha Pollack RN  Outcome: Ongoing, Progressing     Problem: Palliative Care  Goal: Enhanced Quality of Life  3/24/2020 1724 by Anuradha Pollack RN  Outcome: Ongoing, Progressing  3/24/2020 1456 by Anuradha Pollack  RN  Outcome: Ongoing, Progressing     Pt had a BM today, and one instance of undocumented urine output.  Droplet and airborne precautions maintained for + COVID result.  HR elevated, pulse ox continues to decline.  Hospice nurse rounded on her this afternoon per her assessment believes fluid has begun collecting at lung bases.  Aox0, but arousable to touch.  IV PRN ativan seems to help agitation.      Anuradha Pollack RN  5:27 PM

## 2020-03-24 NOTE — PROGRESS NOTES
[facility]  Inpatient Progress Note    Patient Name: Sachin Birch  MRN: [id number]  Admission Date: 3/22/2020  Hospital Length of Stay: 2 days  Code Status: DNR   Attending Provider: Barbie Chowdhury MD  Hospice Diagnosis:Acute respiratory failure with hypoxia        Assessment/Plan:     Hospice qualifying diagnosis:  Acute respiratory failure with hypoxia    Hospice Encounter / Goals of care discussion:     Narrative: Sachin Birch is a 89 yo lady, who was living at home self sufficient until lately. Her   7 years ago and she has been very active enjoying time with her daughter until she experienced fatigue, weakness and fever about 7 days ago.   She was taken in by her daughter but continued to weaken, experiencing poor appetite and little intake. She then developed a fever and was brought to the ED.      She is found to have profound hypoxemia, fever and Chest CT findings showing patchy pulmonary infiltrates suggesting Covid-19 infection. Testing had been performed at an outside lab a few days prior.      Discussions with the ED physician was held. The pt. Has a POA (daughter More) and had discussed her wishes in detail in the past and was clear that she would not want aggressive care and mechanical ventilation if she were to get ill.      Hospice was consulted and the pt. Admitted with acute severe hypoxemic respiratory failure.      Interval Events:  Over night pt. Had an episode where she suddenly sat up on the side of the bed. Required ativan at midnight and this AM. Otherwise has been mostly sleeping without any distress.   Discussed with daughter.       1: Symptom Assessment:     - Pain: does not appear to be in pain     - Dyspnea: breathing unlabored. On oxygen. Sat ~ 85mmHg     - Agitation: not agitated currently. Episode of restlesness over night.      - Mentation: somnolent    3: Current Treatment regiment   Per routine comfort care protocol    4: Family discussion   Discussed home  hospice option with daughter in detail.    Agreed to speak with family regarding how home care could be arranged.      Reached out to [facility] regarding logistics of home hospice discharge considering pt. Is COVID pos.     5: Plan of care   Follow up daily. Discharge planning underway    6: Follow up plans:    Daily.     Subjective:       Review of Symptoms    Review of Systems    CAM / Delirium _x_ --  ___+   Constipation     _x_ --  ___+   Diarrhea           _x_ --  ___+    Objective:     Vital Signs (Most Recent):  /65 (BP Location: Left arm, Patient Position: Lying)   Pulse 85   Temp 97.9 °F (36.6 °C) (Oral)   Resp 16   Ht 5' (1.524 m)   Wt 53.2 kg (117 lb 4.6 oz)   SpO2 (!) 84%   Breastfeeding? No   BMI 22.91 kg/m²         Physical Exam  No exam due to COVID pos. To avoid social contacts.     Medications:    Scheduled Meds:  Continuous Infusions:  PRN Meds:.glycopyrrolate, haloperidol lactate, lorazepam, lorazepam, morphine         > 50% of 35 min visit spent in chart review, face to face discussion of goals of care,  symptom assessment, coordination of care and emotional support.    Guille Wolf MD  Palliative Medicine  [facility]

## 2020-03-24 NOTE — PLAN OF CARE
Overnight patient had one episode of agitation where she sat up in bed and pulled off her non rebreather mask she could not be redirected.  Administered iv ativan and full relief obtained she then rested comfortably the rest of the night.

## 2020-03-25 NOTE — PLAN OF CARE
Patient is on comfort care resting. Ativan given when agitated. Frequent rounds made to assess pain and safety. Daughter at bedside. Purewick in place. Low UO. NOt drinking clears, responds to pain only. Pain controlled with PRN Morphine X1. All vitals stable; no acute events this shift. Pt. Remaining free from falls or injury throughout shift; bed in lowest position; side rails up X2; call light within reach; pt instructed to call for assistance as needed - verbalized understanding. Q2h rounding on patient. Will continue to monitor.

## 2020-03-25 NOTE — PLAN OF CARE
POC reviewed with the pt's family. No acute changes. Pt given pain/anxiety medications as needed for facial grimacing. Will continue to monitor.

## 2020-03-25 NOTE — PROGRESS NOTES
[facility]  Inpatient Progress Note    Patient Name: Sachin Birch  MRN: [id number]  Admission Date: 3/22/2020  Hospital Length of Stay: 3 days  Code Status: DNR   Attending Provider: Barbie Chowdhury MD  Hospice Diagnosis:Acute respiratory failure with hypoxia        Assessment/Plan:     Hospice qualifying diagnosis:  Acute respiratory failure with hypoxia    Hospice Encounter / Goals of care discussion:     Narrative: Sachin Birch is a 89 yo lady, who was living at home self sufficient until lately. Her   7 years ago and she has been very active enjoying time with her daughter until she experienced fatigue, weakness and fever about 7 days ago.   She was taken in by her daughter but continued to weaken, experiencing poor appetite and little intake. She then developed a fever and was brought to the ED.      She is found to have profound hypoxemia, fever and Chest CT findings showing patchy pulmonary infiltrates suggesting Covid-19 infection. Testing had been performed at an outside lab a few days prior.      Discussions with the ED physician was held. The pt. Has a POA (daughter More) and had discussed her wishes in detail in the past and was clear that she would not want aggressive care and mechanical ventilation if she were to get ill.      Hospice was consulted and the pt. Admitted with acute severe hypoxemic respiratory failure.      Interval Events:  Less responsive, requiring more ativan over night. Oxygenation has declined even further. Does not appear to be in respiratory distress today.   Discussed with daughter over the phone.       1: Symptom Assessment:     - Pain: does not appear to be in pain     - Dyspnea: breathing unlabored. On oxygen. Sat ~ 75mmHg     - Agitation:agitated and restless. Required PRN medications     - Mentation: somnolent    3: Current Treatment regiment   Per routine comfort care protocol    4: Family discussion   - worried that pt. Is declining and that  transition to home environment may not be feasible.    - daughter agrees, she was especially worried about her family being exposed.    - we will continue aggressive sx. Management at [facility].    - life expectancy felt to be very short (24-48 hrs)    5: Plan of care   Follow up daily.     6: Follow up plans:    Daily.     Subjective:       Review of Symptoms    Review of Systems    CAM / Delirium _x_ --  ___+   Constipation     _x_ --  ___+   Diarrhea           _x_ --  ___+    Objective:     Vital Signs (Most Recent):  BP (!) 179/79   Pulse 107   Temp 97.7 °F (36.5 °C) (Axillary)   Resp 18   Ht 5' (1.524 m)   Wt 53.2 kg (117 lb 4.6 oz)   SpO2 (!) 78%   Breastfeeding? No   BMI 22.91 kg/m²         Physical Exam  No exam due to COVID pos. To avoid social contacts.     Medications:    Scheduled Meds:  Continuous Infusions:  PRN Meds:.glycopyrrolate, haloperidol lactate, lorazepam, lorazepam, morphine         > 50% of 25 min visit spent in chart review, face to face discussion of goals of care,  symptom assessment, coordination of care and emotional support.    Guille Wolf MD  Palliative Medicine  [facility]

## 2020-03-26 NOTE — PLAN OF CARE
Pt given morphine and ativan for comfort. Daughter at bedside, reviewed plan of care. Will continue to monitor.

## 2020-03-26 NOTE — PROGRESS NOTES
Informed of patient passing away. Discussed with daughter at the bedside over the phone. Pt. Found unresponsive without pulse or respiration by RN at 8:87 AM.   Support given to pt. Daughter. Certified death at 8:87 AM.  notified.     Guille Wolf MD  Palliative Medicine

## 2020-03-26 NOTE — DISCHARGE SUMMARY
Discharge Summary  [facility]      Admit Date: 3/22/2020    Discharge Date and Time: 3/26/2020 12:29 PM    Discharge Attending Physician: Barbie Chowdhury MD     Diagnoses:  Active Hospital Problems    Diagnosis  POA    *Acute respiratory failure with hypoxia [J96.01]  Yes    Acute respiratory failure with hypoxemia [J96.01]  Yes      Resolved Hospital Problems   No resolved problems to display.       Discharged Condition:     Hospital Course: Ms. Birch was admitted with respiratory failure and profound hypoxemia. COVID-19 was detected. She was admitted to hospice for end of life care and  peacefully on 2020 in the presence of her daughter.       No discharge procedures on file.

## 2020-03-27 LAB
BACTERIA BLD CULT: NORMAL
BACTERIA BLD CULT: NORMAL

## 2020-03-28 ENCOUNTER — TELEPHONE (OUTPATIENT)
Dept: INTERNAL MEDICINE | Facility: CLINIC | Age: 85
End: 2020-03-28

## 2020-03-28 NOTE — TELEPHONE ENCOUNTER
Should send family card.  She had positive COVID tet and the rest of family should be quarantined and tested if they are symptomatic. We should send card to family

## 2020-03-30 ENCOUNTER — TELEPHONE (OUTPATIENT)
Dept: INTERNAL MEDICINE | Facility: CLINIC | Age: 85
End: 2020-03-30

## 2020-03-30 NOTE — TELEPHONE ENCOUNTER
Her family should be involved in evaluation for COVID, so if they have not done that then we should do virtual visits for them

## 2020-03-30 NOTE — TELEPHONE ENCOUNTER
----- Message from Zulma Hernandez sent at 3/30/2020  9:21 AM CDT -----  Pt daughter calling to let the doctor know that the pt has passed away from the covid-19 virus.

## 2024-05-15 NOTE — PLAN OF CARE
"Ambulatory Visit  Name: Nury Hernandez      : 2001      MRN: [id number]  Encounter Provider: Padmini Urias MD  Encounter Date: 5/15/2024   Encounter department: [facility] [place]    Assessment & Plan   1. Irregular menses  -     norethindrone-ethinyl estradiol (Loestrin Fe ) 1-20 MG-MCG per tablet; Take 1 tablet by mouth daily  - reviewed diagnosis of PCOS, that she likely meets criteria for same. Discussed that PCOS in itself does not require treatment, but that we could treat symptoms of same. As she does not have sx of hirsutism, would only treat AUB.   - she also has concerns for acne, had discussed spironolactone with dermatology, but was concerned about this given her POTS. We discussed the similar mechanism of action of OCPs in decreasing testosterone levels. Would prefer OCP to spironolactone given her POTS  - after discussion, Nury would like to trial OCPs. Safe and effective use reviewed. Will RTO for BP check or contact us if she has a BP done elsewhere in the next few months    History of Present Illness     Nury Hernandez is a 23 y.o. female who presents for discussion regarding labs recently performed by her PCP that indicated elevated Free testosterone and DHEA, concern for PCOS. PCP suggested consideration of OCPs. Menses are occurring q 18- 26 days. Not overly painful. She does have menstrual migraines, has difficulty scheduling her rescue meds as she is not sure when she will menstruate.     Review of Systems as above     Objective     /76 (BP Location: Left arm, Patient Position: Sitting, Cuff Size: Standard)   Ht 5' 7\" (1.702 m)   Wt 68.5 kg (151 lb)   LMP 2024 (Exact Date) Comment: 18-26 day cycle  BMI 23.65 kg/m²     Physical Exam  Constitutional:       Appearance: She is well-developed.   HENT:      Head: Normocephalic and atraumatic.   Eyes:      Pupils: Pupils are equal, round, and reactive to light.   Pulmonary: " Problem: Adult Inpatient Plan of Care  Goal: Plan of Care Review  Outcome: Ongoing (interventions implemented as appropriate)   01/08/19 2139   Plan of Care Review   Plan of Care Reviewed With patient   Progress improving   Pt resting in bed, assisted to bathroom with out difficulty, denies needs at this time will cont to monitor pt.            Effort: Pulmonary effort is normal.   Musculoskeletal:      Cervical back: Normal range of motion.   Neurological:      Mental Status: She is alert and oriented to person, place, and time.   Psychiatric:         Behavior: Behavior normal.       Administrative Statements